# Patient Record
Sex: FEMALE | Race: WHITE | NOT HISPANIC OR LATINO | URBAN - METROPOLITAN AREA
[De-identification: names, ages, dates, MRNs, and addresses within clinical notes are randomized per-mention and may not be internally consistent; named-entity substitution may affect disease eponyms.]

---

## 2017-05-11 ENCOUNTER — INPATIENT (INPATIENT)
Facility: HOSPITAL | Age: 62
LOS: 2 days | Discharge: HOME | DRG: 389 | End: 2017-05-14
Attending: SPECIALIST | Admitting: SPECIALIST
Payer: COMMERCIAL

## 2017-05-11 VITALS
DIASTOLIC BLOOD PRESSURE: 73 MMHG | WEIGHT: 154.98 LBS | RESPIRATION RATE: 20 BRPM | OXYGEN SATURATION: 99 % | TEMPERATURE: 99 F | HEART RATE: 105 BPM | SYSTOLIC BLOOD PRESSURE: 103 MMHG

## 2017-05-11 DIAGNOSIS — K56.69 OTHER INTESTINAL OBSTRUCTION: ICD-10-CM

## 2017-05-11 LAB
ALBUMIN SERPL ELPH-MCNC: 3.6 G/DL — SIGNIFICANT CHANGE UP (ref 3.5–5)
ALP SERPL-CCNC: 60 U/L — SIGNIFICANT CHANGE UP (ref 40–120)
ALT FLD-CCNC: 25 U/L DA — SIGNIFICANT CHANGE UP (ref 10–60)
ANION GAP SERPL CALC-SCNC: 9 MMOL/L — SIGNIFICANT CHANGE UP (ref 5–17)
ANION GAP SERPL CALC-SCNC: 9 MMOL/L — SIGNIFICANT CHANGE UP (ref 5–17)
APPEARANCE UR: CLEAR — SIGNIFICANT CHANGE UP
APPEARANCE UR: CLEAR — SIGNIFICANT CHANGE UP
AST SERPL-CCNC: 51 U/L — HIGH (ref 10–40)
BASOPHILS # BLD AUTO: 0.1 K/UL — SIGNIFICANT CHANGE UP (ref 0–0.2)
BASOPHILS NFR BLD AUTO: 0.4 % — SIGNIFICANT CHANGE UP (ref 0–2)
BILIRUB SERPL-MCNC: 1.2 MG/DL — SIGNIFICANT CHANGE UP (ref 0.2–1.2)
BILIRUB UR-MCNC: NEGATIVE — SIGNIFICANT CHANGE UP
BILIRUB UR-MCNC: NEGATIVE — SIGNIFICANT CHANGE UP
BUN SERPL-MCNC: 20 MG/DL — HIGH (ref 7–18)
BUN SERPL-MCNC: 21 MG/DL — HIGH (ref 7–18)
CALCIUM SERPL-MCNC: 8.4 MG/DL — SIGNIFICANT CHANGE UP (ref 8.4–10.5)
CALCIUM SERPL-MCNC: 9 MG/DL — SIGNIFICANT CHANGE UP (ref 8.4–10.5)
CHLORIDE SERPL-SCNC: 102 MMOL/L — SIGNIFICANT CHANGE UP (ref 96–108)
CHLORIDE SERPL-SCNC: 103 MMOL/L — SIGNIFICANT CHANGE UP (ref 96–108)
CO2 SERPL-SCNC: 23 MMOL/L — SIGNIFICANT CHANGE UP (ref 22–31)
CO2 SERPL-SCNC: 28 MMOL/L — SIGNIFICANT CHANGE UP (ref 22–31)
COLOR SPEC: YELLOW — SIGNIFICANT CHANGE UP
COLOR SPEC: YELLOW — SIGNIFICANT CHANGE UP
CREAT SERPL-MCNC: 1.14 MG/DL — SIGNIFICANT CHANGE UP (ref 0.5–1.3)
CREAT SERPL-MCNC: 1.19 MG/DL — SIGNIFICANT CHANGE UP (ref 0.5–1.3)
DIFF PNL FLD: ABNORMAL
DIFF PNL FLD: ABNORMAL
EOSINOPHIL # BLD AUTO: 0.1 K/UL — SIGNIFICANT CHANGE UP (ref 0–0.5)
EOSINOPHIL NFR BLD AUTO: 0.8 % — SIGNIFICANT CHANGE UP (ref 0–6)
GLUCOSE SERPL-MCNC: 122 MG/DL — HIGH (ref 70–99)
GLUCOSE SERPL-MCNC: 136 MG/DL — HIGH (ref 70–99)
GLUCOSE UR QL: NEGATIVE — SIGNIFICANT CHANGE UP
GLUCOSE UR QL: NEGATIVE — SIGNIFICANT CHANGE UP
HCT VFR BLD CALC: 48.1 % — HIGH (ref 34.5–45)
HGB BLD-MCNC: 16.1 G/DL — HIGH (ref 11.5–15.5)
KETONES UR-MCNC: ABNORMAL
KETONES UR-MCNC: ABNORMAL
LEUKOCYTE ESTERASE UR-ACNC: ABNORMAL
LEUKOCYTE ESTERASE UR-ACNC: ABNORMAL
LIDOCAIN IGE QN: 421 U/L — HIGH (ref 73–393)
LYMPHOCYTES # BLD AUTO: 0.5 K/UL — LOW (ref 1–3.3)
LYMPHOCYTES # BLD AUTO: 3.3 % — LOW (ref 13–44)
MCHC RBC-ENTMCNC: 29.2 PG — SIGNIFICANT CHANGE UP (ref 27–34)
MCHC RBC-ENTMCNC: 33.4 GM/DL — SIGNIFICANT CHANGE UP (ref 32–36)
MCV RBC AUTO: 87.4 FL — SIGNIFICANT CHANGE UP (ref 80–100)
MONOCYTES # BLD AUTO: 0.9 K/UL — SIGNIFICANT CHANGE UP (ref 0–0.9)
MONOCYTES NFR BLD AUTO: 6.1 % — SIGNIFICANT CHANGE UP (ref 2–14)
NEUTROPHILS # BLD AUTO: 12.9 K/UL — HIGH (ref 1.8–7.4)
NEUTROPHILS NFR BLD AUTO: 89.3 % — HIGH (ref 43–77)
NITRITE UR-MCNC: NEGATIVE — SIGNIFICANT CHANGE UP
NITRITE UR-MCNC: NEGATIVE — SIGNIFICANT CHANGE UP
PH UR: 5 — SIGNIFICANT CHANGE UP (ref 5–8)
PH UR: 5 — SIGNIFICANT CHANGE UP (ref 5–8)
PLATELET # BLD AUTO: 188 K/UL — SIGNIFICANT CHANGE UP (ref 150–400)
POTASSIUM SERPL-MCNC: 4.4 MMOL/L — SIGNIFICANT CHANGE UP (ref 3.5–5.3)
POTASSIUM SERPL-MCNC: 6 MMOL/L — HIGH (ref 3.5–5.3)
POTASSIUM SERPL-SCNC: 4.4 MMOL/L — SIGNIFICANT CHANGE UP (ref 3.5–5.3)
POTASSIUM SERPL-SCNC: 6 MMOL/L — HIGH (ref 3.5–5.3)
PROT SERPL-MCNC: 8.5 G/DL — HIGH (ref 6–8.3)
PROT UR-MCNC: 15
PROT UR-MCNC: NEGATIVE — SIGNIFICANT CHANGE UP
RBC # BLD: 5.5 M/UL — HIGH (ref 3.8–5.2)
RBC # FLD: 11.9 % — SIGNIFICANT CHANGE UP (ref 10.3–14.5)
SODIUM SERPL-SCNC: 135 MMOL/L — SIGNIFICANT CHANGE UP (ref 135–145)
SODIUM SERPL-SCNC: 139 MMOL/L — SIGNIFICANT CHANGE UP (ref 135–145)
SP GR SPEC: 1.02 — SIGNIFICANT CHANGE UP (ref 1.01–1.02)
SP GR SPEC: 1.02 — SIGNIFICANT CHANGE UP (ref 1.01–1.02)
UROBILINOGEN FLD QL: NEGATIVE — SIGNIFICANT CHANGE UP
UROBILINOGEN FLD QL: NEGATIVE — SIGNIFICANT CHANGE UP
WBC # BLD: 14.4 K/UL — HIGH (ref 3.8–10.5)
WBC # FLD AUTO: 14.4 K/UL — HIGH (ref 3.8–10.5)

## 2017-05-11 PROCEDURE — 71010: CPT | Mod: 26

## 2017-05-11 PROCEDURE — 99285 EMERGENCY DEPT VISIT HI MDM: CPT

## 2017-05-11 PROCEDURE — 74177 CT ABD & PELVIS W/CONTRAST: CPT | Mod: 26

## 2017-05-11 RX ORDER — SODIUM CHLORIDE 9 MG/ML
3 INJECTION INTRAMUSCULAR; INTRAVENOUS; SUBCUTANEOUS ONCE
Qty: 0 | Refills: 0 | Status: COMPLETED | OUTPATIENT
Start: 2017-05-11 | End: 2017-05-11

## 2017-05-11 RX ORDER — METOPROLOL TARTRATE 50 MG
2.5 TABLET ORAL EVERY 6 HOURS
Qty: 0 | Refills: 0 | Status: DISCONTINUED | OUTPATIENT
Start: 2017-05-11 | End: 2017-05-13

## 2017-05-11 RX ORDER — ONDANSETRON 8 MG/1
4 TABLET, FILM COATED ORAL EVERY 6 HOURS
Qty: 0 | Refills: 0 | Status: DISCONTINUED | OUTPATIENT
Start: 2017-05-11 | End: 2017-05-14

## 2017-05-11 RX ORDER — FAMOTIDINE 10 MG/ML
20 INJECTION INTRAVENOUS ONCE
Qty: 0 | Refills: 0 | Status: COMPLETED | OUTPATIENT
Start: 2017-05-11 | End: 2017-05-11

## 2017-05-11 RX ORDER — MORPHINE SULFATE 50 MG/1
4 CAPSULE, EXTENDED RELEASE ORAL ONCE
Qty: 0 | Refills: 0 | Status: DISCONTINUED | OUTPATIENT
Start: 2017-05-11 | End: 2017-05-11

## 2017-05-11 RX ORDER — PANTOPRAZOLE SODIUM 20 MG/1
40 TABLET, DELAYED RELEASE ORAL DAILY
Qty: 0 | Refills: 0 | Status: DISCONTINUED | OUTPATIENT
Start: 2017-05-11 | End: 2017-05-14

## 2017-05-11 RX ORDER — SODIUM CHLORIDE 9 MG/ML
1000 INJECTION INTRAMUSCULAR; INTRAVENOUS; SUBCUTANEOUS ONCE
Qty: 0 | Refills: 0 | Status: COMPLETED | OUTPATIENT
Start: 2017-05-11 | End: 2017-05-11

## 2017-05-11 RX ORDER — LIDOCAINE 4 G/100G
20 CREAM TOPICAL ONCE
Qty: 0 | Refills: 0 | Status: COMPLETED | OUTPATIENT
Start: 2017-05-11 | End: 2017-05-11

## 2017-05-11 RX ORDER — HEPARIN SODIUM 5000 [USP'U]/ML
5000 INJECTION INTRAVENOUS; SUBCUTANEOUS EVERY 8 HOURS
Qty: 0 | Refills: 0 | Status: DISCONTINUED | OUTPATIENT
Start: 2017-05-11 | End: 2017-05-14

## 2017-05-11 RX ORDER — SODIUM CHLORIDE 9 MG/ML
1000 INJECTION, SOLUTION INTRAVENOUS
Qty: 0 | Refills: 0 | Status: DISCONTINUED | OUTPATIENT
Start: 2017-05-11 | End: 2017-05-14

## 2017-05-11 RX ADMIN — LIDOCAINE 20 MILLILITER(S): 4 CREAM TOPICAL at 18:38

## 2017-05-11 RX ADMIN — SODIUM CHLORIDE 1000 MILLILITER(S): 9 INJECTION INTRAMUSCULAR; INTRAVENOUS; SUBCUTANEOUS at 16:15

## 2017-05-11 RX ADMIN — SODIUM CHLORIDE 3 MILLILITER(S): 9 INJECTION INTRAMUSCULAR; INTRAVENOUS; SUBCUTANEOUS at 16:15

## 2017-05-11 RX ADMIN — MORPHINE SULFATE 4 MILLIGRAM(S): 50 CAPSULE, EXTENDED RELEASE ORAL at 23:25

## 2017-05-11 RX ADMIN — SODIUM CHLORIDE 125 MILLILITER(S): 9 INJECTION, SOLUTION INTRAVENOUS at 22:42

## 2017-05-11 RX ADMIN — MORPHINE SULFATE 4 MILLIGRAM(S): 50 CAPSULE, EXTENDED RELEASE ORAL at 22:53

## 2017-05-11 RX ADMIN — PANTOPRAZOLE SODIUM 40 MILLIGRAM(S): 20 TABLET, DELAYED RELEASE ORAL at 22:42

## 2017-05-11 RX ADMIN — HEPARIN SODIUM 5000 UNIT(S): 5000 INJECTION INTRAVENOUS; SUBCUTANEOUS at 22:42

## 2017-05-11 RX ADMIN — Medication 30 MILLILITER(S): at 16:15

## 2017-05-11 RX ADMIN — FAMOTIDINE 20 MILLIGRAM(S): 10 INJECTION INTRAVENOUS at 16:15

## 2017-05-11 NOTE — H&P ADULT - HISTORY OF PRESENT ILLNESS
63 y/o female with h/o anxiety, HTN, HCL, heart murmur, gastritis/ulcer dx, UTI, SBO  c/o lower abd crampy pain, loss of appetite since yesterday; no f/c/n/v; +bm yesterday no flatus  denies PSH

## 2017-05-11 NOTE — ED PROVIDER NOTE - OBJECTIVE STATEMENT
63 y/o F pt with PMHx of Anxiety (on Zoloft and Sertraline 25mg), Heart Murmur, Herpes Simplex, HTN (controlled; on Metoprolol 25mg and Lisinopril 12.5mg), SBO, UTI, Gastritis, and Hypercholesterolemia (on Rosuvastatin 25mg) presents to ED c/o lower abd pain (cramping pain), general weakness, belching, loss of appetite, and soft stools (yellow color) since yesterday (19:00pm). Pt also states chills in ED, as well as a HA earlier today that was resolved with Tylenol. Pt reports that her presenting sx's do not feel similar to the sx's associated with her previous SBO. Pt denies fever, diarrhea, dysuria, burning with urination, urinary frequency, hematuria, or any other complaints. Pt also denies Hx of smoking, EtOH abuse/use, drug use/abuse, or Hx of similar sx's/episodes. Pt reports taking Imodium and Gas-X with no relief of sx's. NKDA.

## 2017-05-11 NOTE — ED PROVIDER NOTE - PROGRESS NOTE DETAILS
Sita: signout from Dr Winters to followup CT scan. CT reveals SBO. NGT placed, admit to surgery. morphine and zofran for comfort

## 2017-05-11 NOTE — H&P ADULT - NSHPLABSRESULTS_GEN_ALL_CORE
CT- Possible small   gallstones in the gallbladder neck. No evidence for thickened gallbladder   wall or pericholecystic fluid. In the pancreatic body, there is a 1.4 cm   cystic lesion. Multiple small nonspecific hypodense   lesions in the spleen, too small to characterize. Stable punctate   calcifications in the left adrenal. The right kidney and right adrenal   appear unremarkable. Tiny hypodense lesions in the left kidney, too small   to characterize.    Evaluation of the bowel structures is limited by lack of oral contrast.   The appendix appears normal. Small bowel dilatation is again noted with   an apparent transition point at the distal ileum in the right lower   quadrant, compatible with recurrence of small bowel   obstruction. Small ascites in the anterior abdomen and mesentery for   which associated bowel ischemia cannot be excluded. No gross evidence for   thickened bowel wall. No evidence for free air or enlarged lymph node.

## 2017-05-11 NOTE — ED PROVIDER NOTE - NS ED MD SCRIBE ATTENDING SCRIBE SECTIONS
HISTORY OF PRESENT ILLNESS/VITAL SIGNS( Pullset)/PHYSICAL EXAM/PAST MEDICAL/SURGICAL/SOCIAL HISTORY/HIV/REVIEW OF SYSTEMS/RESULTS/DISPOSITION

## 2017-05-11 NOTE — H&P ADULT - NSHPPHYSICALEXAM_GEN_ALL_CORE
NAD  NGT placed by ED  abd softly distended, mild generalized tenderness at this time, no rebound or guarding, no peritoneal signs

## 2017-05-11 NOTE — ED PROVIDER NOTE - PMH
Anxiety    Gastritis    Heart murmur    Herpes simplex    HTN (hypertension)    Hypercholesteremia    SBO (small bowel obstruction)    UTI (urinary tract infection)

## 2017-05-11 NOTE — ED PROVIDER NOTE - MEDICAL DECISION MAKING DETAILS
61 y/o F pt presents with general weakness, lower abd pain, belching, loss of appetite, and soft stools since last night. Plan for blood work, IVFs, labs, give medication for gastritis, CT Abd/Pel, and reassess.

## 2017-05-11 NOTE — ED ADULT NURSE NOTE - OBJECTIVE STATEMENT
Presented to ED complaining of "abdominal cramps since last night at 7pm." AA&Ox3. Denies nausea, vomiting, diarrhea and fever.

## 2017-05-12 DIAGNOSIS — K56.69 OTHER INTESTINAL OBSTRUCTION: ICD-10-CM

## 2017-05-12 LAB
ANION GAP SERPL CALC-SCNC: 8 MMOL/L — SIGNIFICANT CHANGE UP (ref 5–17)
BUN SERPL-MCNC: 20 MG/DL — HIGH (ref 7–18)
CALCIUM SERPL-MCNC: 8.2 MG/DL — LOW (ref 8.4–10.5)
CHLORIDE SERPL-SCNC: 108 MMOL/L — SIGNIFICANT CHANGE UP (ref 96–108)
CO2 SERPL-SCNC: 29 MMOL/L — SIGNIFICANT CHANGE UP (ref 22–31)
CREAT SERPL-MCNC: 1.18 MG/DL — SIGNIFICANT CHANGE UP (ref 0.5–1.3)
GLUCOSE SERPL-MCNC: 142 MG/DL — HIGH (ref 70–99)
HCT VFR BLD CALC: 38.2 % — SIGNIFICANT CHANGE UP (ref 34.5–45)
HGB BLD-MCNC: 12.9 G/DL — SIGNIFICANT CHANGE UP (ref 11.5–15.5)
MCHC RBC-ENTMCNC: 29.4 PG — SIGNIFICANT CHANGE UP (ref 27–34)
MCHC RBC-ENTMCNC: 33.7 GM/DL — SIGNIFICANT CHANGE UP (ref 32–36)
MCV RBC AUTO: 87.2 FL — SIGNIFICANT CHANGE UP (ref 80–100)
PLATELET # BLD AUTO: 180 K/UL — SIGNIFICANT CHANGE UP (ref 150–400)
POTASSIUM SERPL-MCNC: 4.1 MMOL/L — SIGNIFICANT CHANGE UP (ref 3.5–5.3)
POTASSIUM SERPL-SCNC: 4.1 MMOL/L — SIGNIFICANT CHANGE UP (ref 3.5–5.3)
RBC # BLD: 4.38 M/UL — SIGNIFICANT CHANGE UP (ref 3.8–5.2)
RBC # FLD: 12 % — SIGNIFICANT CHANGE UP (ref 10.3–14.5)
SODIUM SERPL-SCNC: 145 MMOL/L — SIGNIFICANT CHANGE UP (ref 135–145)
WBC # BLD: 3 K/UL — LOW (ref 3.8–10.5)
WBC # FLD AUTO: 3 K/UL — LOW (ref 3.8–10.5)

## 2017-05-12 PROCEDURE — 74250 X-RAY XM SM INT 1CNTRST STD: CPT | Mod: 26

## 2017-05-12 RX ORDER — CEFTRIAXONE 500 MG/1
1 INJECTION, POWDER, FOR SOLUTION INTRAMUSCULAR; INTRAVENOUS EVERY 24 HOURS
Qty: 0 | Refills: 0 | Status: DISCONTINUED | OUTPATIENT
Start: 2017-05-13 | End: 2017-05-14

## 2017-05-12 RX ORDER — CEFTRIAXONE 500 MG/1
1 INJECTION, POWDER, FOR SOLUTION INTRAMUSCULAR; INTRAVENOUS ONCE
Qty: 0 | Refills: 0 | Status: COMPLETED | OUTPATIENT
Start: 2017-05-12 | End: 2017-05-12

## 2017-05-12 RX ORDER — KETOTIFEN FUMARATE 0.34 MG/ML
1 SOLUTION OPHTHALMIC
Qty: 0 | Refills: 0 | Status: DISCONTINUED | OUTPATIENT
Start: 2017-05-12 | End: 2017-05-12

## 2017-05-12 RX ORDER — CEFTRIAXONE 500 MG/1
INJECTION, POWDER, FOR SOLUTION INTRAMUSCULAR; INTRAVENOUS
Qty: 0 | Refills: 0 | Status: DISCONTINUED | OUTPATIENT
Start: 2017-05-12 | End: 2017-05-14

## 2017-05-12 RX ADMIN — SODIUM CHLORIDE 125 MILLILITER(S): 9 INJECTION, SOLUTION INTRAVENOUS at 21:37

## 2017-05-12 RX ADMIN — HEPARIN SODIUM 5000 UNIT(S): 5000 INJECTION INTRAVENOUS; SUBCUTANEOUS at 12:08

## 2017-05-12 RX ADMIN — HEPARIN SODIUM 5000 UNIT(S): 5000 INJECTION INTRAVENOUS; SUBCUTANEOUS at 05:56

## 2017-05-12 RX ADMIN — PANTOPRAZOLE SODIUM 40 MILLIGRAM(S): 20 TABLET, DELAYED RELEASE ORAL at 12:08

## 2017-05-12 RX ADMIN — Medication 1.25 MILLIGRAM(S): at 05:57

## 2017-05-12 RX ADMIN — CEFTRIAXONE 100 GRAM(S): 500 INJECTION, POWDER, FOR SOLUTION INTRAMUSCULAR; INTRAVENOUS at 12:08

## 2017-05-12 RX ADMIN — HEPARIN SODIUM 5000 UNIT(S): 5000 INJECTION INTRAVENOUS; SUBCUTANEOUS at 21:37

## 2017-05-12 RX ADMIN — Medication 2.5 MILLIGRAM(S): at 05:56

## 2017-05-12 NOTE — PROGRESS NOTE ADULT - ASSESSMENT
Pt with SBO without prior surgery.  UGI showed slow transit time but progression into colon without evidence of tumor or other abnormality

## 2017-05-12 NOTE — CONSULT NOTE ADULT - ASSESSMENT
Pt with as above with SBO and abnormal EKG.  1. NPO  2.IVF  3. Echocardiogram.  4. Continue BP medication IV for now.  5. GI and DVT prophylaxis.

## 2017-05-12 NOTE — PROGRESS NOTE ADULT - SUBJECTIVE AND OBJECTIVE BOX
62y Female without complaints. no nausea/vomitting. no flatus/bm yet    NGT-lws, output 100cc    T(C): 37.1, Max: 38.1 (05-11 @ 23:27)  HR: 88 (87 - 105)  BP: 90/44 (90/44 - 112/59)  RR: 17 (16 - 20)  SpO2: 98% (98% - 100%)        PE:  Gen: comfortable, NAD  Abd: Soft, NT/ND

## 2017-05-12 NOTE — CONSULT NOTE ADULT - SUBJECTIVE AND OBJECTIVE BOX
CHIEF COMPLAINT:Patient is a 62y old  Female who presents with a chief complaint of abd pain.      HPI:  61 y/o female with h/o anxiety, HTN, Lipid D/O,gastritis/ulcer , UTI, SBO who presents to ER with  c/o lower abd crampy pain, loss of appetite since yesterday. Pt has been found to have SBO and abnormal EKG on admission.      PAST MEDICAL & SURGICAL HISTORY:  Gastritis  SBO (small bowel obstruction)  UTI (urinary tract infection)  Hypercholesteremia  Herpes simplex  HTN (hypertension)  Anxiety      MEDICATIONS  (STANDING):  dextrose 5% + sodium chloride 0.9%. 1000milliLiter(s) IV Continuous <Continuous>  heparin  Injectable 5000Unit(s) SubCutaneous every 8 hours  pantoprazole  Injectable 40milliGRAM(s) IV Push daily  metoprolol Injectable 2.5milliGRAM(s) IV Push every 6 hours  enalaprilat Injectable 1.25milliGRAM(s) IV Push every 6 hours  cefTRIAXone   IVPB  IV Intermittent   ketotifen 0.025% Ophthalmic Solution 1Drop(s) Both EYES two times a day    MEDICATIONS  (PRN):  ondansetron Injectable 4milliGRAM(s) IV Push every 6 hours PRN Nausea and/or Vomiting      FAMILY HISTORY:  No pertinent family history in first degree relatives      SOCIAL HISTORY:    [ X] Non-smoker    [X ] Denies Alcohol    Allergies    No Known Allergies        REVIEW OF SYSTEMS:  CONSTITUTIONAL: No fever, weight loss, or fatigue  EYES: No eye pain, visual disturbances, or discharge  ENT:  No difficulty hearing, tinnitus, vertigo; No sinus or throat pain  NECK: No pain or stiffness  RESPIRATORY: No cough, wheezing, chills or hemoptysis; No Shortness of Breath  CARDIOVASCULAR: No chest pain, palpitations, passing out, dizziness, or leg swelling  GASTROINTESTINAL: No abdominal or epigastric pain. No nausea, vomiting, or hematemesis; No diarrhea or constipation. No melena or hematochezia.  GENITOURINARY: No dysuria, frequency, hematuria, or incontinence  NEUROLOGICAL: No headaches, memory loss, loss of strength, numbness, or tremors  SKIN: No itching, burning, rashes, or lesions   LYMPH Nodes: No enlarged glands  ENDOCRINE: No heat or cold intolerance; No hair loss  MUSCULOSKELETAL: No joint pain or swelling; No muscle, back, or extremity pain  PSYCHIATRIC: No depression, anxiety, mood swings, or difficulty sleeping  HEME/LYMPH: No easy bruising, or bleeding gums  ALLERGY AND IMMUNOLOGIC: No hives or eczema	      PHYSICAL EXAM:  T(C): 36.3, Max: 38.1 (05-11 @ 23:27)  HR: 86 (86 - 105)  BP: 103/56 (90/44 - 112/59)  RR: 15 (15 - 20)  SpO2: 100% (98% - 100%)    I&O's Summary    I & Os for current day (as of 12 May 2017 12:20)  =============================================  IN: 900 ml / OUT: 100 ml / NET: 800 ml      Appearance: Normal	  HEENT:   Normal oral mucosa, PERRL, EOMI	  Lymphatic: No lymphadenopathy  Cardiovascular: Normal S1 S2, No JVD, No murmurs, No edema  Respiratory: Lungs clear to auscultation	  Psychiatry: A & O x 3, Mood & affect appropriate  Gastrointestinal:  Soft, Non-tender, + BS	  Skin: No rashes, No ecchymoses, No cyanosis	  Neurologic: Non-focal  Extremities: Normal range of motion, No clubbing, cyanosis or edema  Vascular: Peripheral pulses palpable 2+ bilaterally    TELEMETRY:NSR 	    ECG: NSR with ST-T depression inferior and anterolateral leads 	  RADIOLOGY:  OTHER: 	  	  LABS:	 	                         12.9   3.0   )-----------( 180      ( 12 May 2017 06:32 )             38.2     05-12    145  |  108  |  20<H>  ----------------------------<  142<H>  4.1   |  29  |  1.18    Ca    8.2<L>      12 May 2017 06:32    TPro  8.5<H>  /  Alb  3.6  /  TBili  1.2  /  DBili  x   /  AST  51<H>  /  ALT  25  /  AlkPhos  60  05-11        PREVIOUS DIAGNOSTIC TESTING:    [ ] Echocardiogram:CHIEF COMPLAINT:Patient is a 62y old  Female who presents with a chief complaint of abd pain/ sbo (11 May 2017 23:49)      HPI:  61 y/o female with h/o anxiety, HTN, HCL, heart murmur, gastritis/ulcer dx, UTI, SBO  c/o lower abd crampy pain, loss of appetite since yesterday; no f/c/n/v; +bm yesterday no flatus  denies PSH (11 May 2017 23:49)      PAST MEDICAL & SURGICAL HISTORY:  Gastritis  SBO (small bowel obstruction)  UTI (urinary tract infection)  Heart murmur  Hypercholesteremia  Herpes simplex  HTN (hypertension)  Anxiety  No significant past surgical history      MEDICATIONS  (STANDING):  dextrose 5% + sodium chloride 0.9%. 1000milliLiter(s) IV Continuous <Continuous>  heparin  Injectable 5000Unit(s) SubCutaneous every 8 hours  pantoprazole  Injectable 40milliGRAM(s) IV Push daily  metoprolol Injectable 2.5milliGRAM(s) IV Push every 6 hours  enalaprilat Injectable 1.25milliGRAM(s) IV Push every 6 hours  cefTRIAXone   IVPB  IV Intermittent   ketotifen 0.025% Ophthalmic Solution 1Drop(s) Both EYES two times a day    MEDICATIONS  (PRN):  ondansetron Injectable 4milliGRAM(s) IV Push every 6 hours PRN Nausea and/or Vomiting      FAMILY HISTORY:  No pertinent family history in first degree relatives      SOCIAL HISTORY:    [ ] Non-smoker  [ ] Smoker  [ ] Alcohol    Allergies    No Known Allergies    Intolerances    	    REVIEW OF SYSTEMS:  CONSTITUTIONAL: No fever, weight loss, or fatigue  EYES: No eye pain, visual disturbances, or discharge  ENT:  No difficulty hearing, tinnitus, vertigo; No sinus or throat pain  NECK: No pain or stiffness  RESPIRATORY: No cough, wheezing, chills or hemoptysis; No Shortness of Breath  CARDIOVASCULAR: No chest pain, palpitations, passing out, dizziness, or leg swelling  GASTROINTESTINAL: No abdominal or epigastric pain. No nausea, vomiting, or hematemesis; No diarrhea or constipation. No melena or hematochezia.  GENITOURINARY: No dysuria, frequency, hematuria, or incontinence  NEUROLOGICAL: No headaches, memory loss, loss of strength, numbness, or tremors  SKIN: No itching, burning, rashes, or lesions   LYMPH Nodes: No enlarged glands  ENDOCRINE: No heat or cold intolerance; No hair loss  MUSCULOSKELETAL: No joint pain or swelling; No muscle, back, or extremity pain  PSYCHIATRIC: No depression, anxiety, mood swings, or difficulty sleeping  HEME/LYMPH: No easy bruising, or bleeding gums  ALLERGY AND IMMUNOLOGIC: No hives or eczema	    [ ] All others negative	  [ ] Unable to obtain    PHYSICAL EXAM:  T(C): 36.3, Max: 38.1 (05-11 @ 23:27)  HR: 86 (86 - 105)  BP: 103/56 (90/44 - 112/59)  RR: 15 (15 - 20)  SpO2: 100% (98% - 100%)  Wt(kg): --  I&O's Summary    I & Os for current day (as of 12 May 2017 12:20)  =============================================  IN: 900 ml / OUT: 100 ml / NET: 800 ml      Appearance: Normal	  HEENT:   Normal oral mucosa, PERRL, EOMI	  Lymphatic: No lymphadenopathy  Cardiovascular: Normal S1 S2, No JVD, No murmurs, No edema  Respiratory: Lungs clear to auscultation	  Psychiatry: A & O x 3, Mood & affect appropriate  Gastrointestinal:  Soft, Non-tender, + BS	  Skin: No rashes, No ecchymoses, No cyanosis	  Neurologic: Non-focal  Extremities: Normal range of motion, No clubbing, cyanosis or edema  Vascular: Peripheral pulses palpable 2+ bilaterally    TELEMETRY: 	    ECG:  	  RADIOLOGY:  OTHER: 	  	  LABS:	 	    CARDIAC MARKERS:                              12.9   3.0   )-----------( 180      ( 12 May 2017 06:32 )             38.2     05-12    145  |  108  |  20<H>  ----------------------------<  142<H>  4.1   |  29  |  1.18    Ca    8.2<L>      12 May 2017 06:32    TPro  8.5<H>  /  Alb  3.6  /  TBili  1.2  /  DBili  x   /  AST  51<H>  /  ALT  25  /  AlkPhos  60  05-11    proBNP:   Lipid Profile:   HgA1c:   TSH:     PREVIOUS DIAGNOSTIC TESTING:    [ ] Echocardiogram:CHIEF COMPLAINT:Patient is a 62y old  Female who presents with a chief complaint of abd pain/ sbo (11 May 2017 23:49)      HPI:  61 y/o female with h/o anxiety, HTN, HCL, heart murmur, gastritis/ulcer dx, UTI, SBO  c/o lower abd crampy pain, loss of appetite since yesterday; no f/c/n/v; +bm yesterday no flatus  denies PSH (11 May 2017 23:49)      PAST MEDICAL & SURGICAL HISTORY:  Gastritis  SBO (small bowel obstruction)  UTI (urinary tract infection)  Heart murmur  Hypercholesteremia  Herpes simplex  HTN (hypertension)  Anxiety  No significant past surgical history      MEDICATIONS  (STANDING):  dextrose 5% + sodium chloride 0.9%. 1000milliLiter(s) IV Continuous <Continuous>  heparin  Injectable 5000Unit(s) SubCutaneous every 8 hours  pantoprazole  Injectable 40milliGRAM(s) IV Push daily  metoprolol Injectable 2.5milliGRAM(s) IV Push every 6 hours  enalaprilat Injectable 1.25milliGRAM(s) IV Push every 6 hours  cefTRIAXone   IVPB  IV Intermittent   ketotifen 0.025% Ophthalmic Solution 1Drop(s) Both EYES two times a day    MEDICATIONS  (PRN):  ondansetron Injectable 4milliGRAM(s) IV Push every 6 hours PRN Nausea and/or Vomiting      FAMILY HISTORY:  No pertinent family history in first degree relatives      SOCIAL HISTORY:    [ ] Non-smoker  [ ] Smoker  [ ] Alcohol    Allergies    No Known Allergies    Intolerances    	    REVIEW OF SYSTEMS:  CONSTITUTIONAL: No fever, weight loss, or fatigue  EYES: No eye pain, visual disturbances, or discharge  ENT:  No difficulty hearing, tinnitus, vertigo; No sinus or throat pain  NECK: No pain or stiffness  RESPIRATORY: No cough, wheezing, chills or hemoptysis; No Shortness of Breath  CARDIOVASCULAR: No chest pain, palpitations, passing out, dizziness, or leg swelling  GASTROINTESTINAL: No abdominal or epigastric pain. No nausea, vomiting, or hematemesis; No diarrhea or constipation. No melena or hematochezia.  GENITOURINARY: No dysuria, frequency, hematuria, or incontinence  NEUROLOGICAL: No headaches, memory loss, loss of strength, numbness, or tremors  SKIN: No itching, burning, rashes, or lesions   LYMPH Nodes: No enlarged glands  ENDOCRINE: No heat or cold intolerance; No hair loss  MUSCULOSKELETAL: No joint pain or swelling; No muscle, back, or extremity pain  PSYCHIATRIC: No depression, anxiety, mood swings, or difficulty sleeping  HEME/LYMPH: No easy bruising, or bleeding gums  ALLERGY AND IMMUNOLOGIC: No hives or eczema	    [ ] All others negative	  [ ] Unable to obtain    PHYSICAL EXAM:  T(C): 36.3, Max: 38.1 (05-11 @ 23:27)  HR: 86 (86 - 105)  BP: 103/56 (90/44 - 112/59)  RR: 15 (15 - 20)  SpO2: 100% (98% - 100%)  Wt(kg): --  I&O's Summary    I & Os for current day (as of 12 May 2017 12:20)  =============================================  IN: 900 ml / OUT: 100 ml / NET: 800 ml      Appearance: Normal	  HEENT:   Normal oral mucosa, PERRL, EOMI	  Lymphatic: No lymphadenopathy  Cardiovascular: Normal S1 S2, No JVD, No murmurs, No edema  Respiratory: Lungs clear to auscultation	  Psychiatry: A & O x 3, Mood & affect appropriate  Gastrointestinal:  Soft, Non-tender, + BS	  Skin: No rashes, No ecchymoses, No cyanosis	  Neurologic: Non-focal  Extremities: Normal range of motion, No clubbing, cyanosis or edema  Vascular: Peripheral pulses palpable 2+ bilaterally    TELEMETRY: 	    ECG:  	  RADIOLOGY:  OTHER: 	  	  LABS:	 	    CARDIAC MARKERS:                              12.9   3.0   )-----------( 180      ( 12 May 2017 06:32 )             38.2     05-12    145  |  108  |  20<H>  ----------------------------<  142<H>  4.1   |  29  |  1.18    Ca    8.2<L>      12 May 2017 06:32    TPro  8.5<H>  /  Alb  3.6  /  TBili  1.2  /  DBili  x   /  AST  51<H>  /  ALT  25  /  AlkPhos  60  05-11    SB series: Partial small bowel obstruction.    CT abd and pelvis: Findings are compatible with small bowel obstruction with a   transition point at the distal ilium in the right lower quadrant. Small   ascites for which associated bowel ischemia cannot be excluded. No gross   bowel wall thickening. The appendix appears normal.    Fatty liver.     Possible gallstones in the gallbladder neck. If clinically indicated,   gallbladder ultrasound may be pursued for further evaluation.    1.4 cm nonspecific cystic lesion in the pancreatic body. If clinically   indicated, abdominal MR without andwith IV contrast may pursued for   further evaluation on a nonemergent outpatient basis.

## 2017-05-12 NOTE — PROGRESS NOTE ADULT - SUBJECTIVE AND OBJECTIVE BOX
Patient is a 62y old  Female who presents with a chief complaint of abd pain/ sbo (11 May 2017 23:49)      HPI:  63 y/o female with h/o anxiety, HTN, HCL, heart murmur, gastritis/ulcer dx, UTI, SBO  c/o lower abd crampy pain, loss of appetite since yesterday; no f/c/n/v; +bm yesterday no flatus  denies PSH (11 May 2017 23:49) Has had no nausea or vomiting today  + diarrhea  following UGI series.  NGT drained 100ml overnight.  No c/o pain.  Hungry!        Vital Signs Last 24 Hrs  T(C): 36.3, Max: 38.1 ( @ 23:27)  T(F): 97.4, Max: 100.6 ( @ 23:27)  HR: 86 (86 - 105)  BP: 103/56 (90/44 - 112/59)  BP(mean): --  RR: 15 (15 - 20)  SpO2: 100% (98% - 100%)      SUBJECTIVE: Pt seen    Vomiting: [ ] YES [x ] NO  Flatus: [x ] YES [ ] NO             Bowel Movement: [x ] YES [ ] NO  Diarrhea: [ x] YES [ ] NO         Void: [x ]YES [ ]No  Constipation: [ ] YES [x ] NO     Pain (0-10):0              Pain Control Adequate: [ ] YES [ ] NO  NGT: 100 ml  PHYSICAL EXAM:  Constitutional: Patient well nourish. well developed mildly obese lying comfortably in bed  .      Respiratory:  Lungs CTA, B/L,   Cardiovascular:  , RRR  Gastrointestinal: Abdomen soft, non distended, + BS, no tenderness or guarding, no incisions no hernias  Genitourinary:  Normal.    Extremities:  No edema, no calf tenderness,                        I&O's Summary    I & Os for current day (as of 12 May 2017 14:14)  =============================================  IN: 900 ml / OUT: 100 ml / NET: 800 ml    I&O's Detail    I & Os for current day (as of 12 May 2017 14:14)  =============================================  IN:    dextrose 5% + sodium chloride 0.9%.: 900 ml    Total IN: 900 ml  ---------------------------------------------  OUT:    Nasoenteral Tube: 100 ml    Total OUT: 100 ml  ---------------------------------------------  Total NET: 800 ml      MEDICATIONS  (STANDING):  dextrose 5% + sodium chloride 0.9%. 1000milliLiter(s) IV Continuous <Continuous>  heparin  Injectable 5000Unit(s) SubCutaneous every 8 hours  pantoprazole  Injectable 40milliGRAM(s) IV Push daily  metoprolol Injectable 2.5milliGRAM(s) IV Push every 6 hours  enalaprilat Injectable 1.25milliGRAM(s) IV Push every 6 hours  cefTRIAXone   IVPB  IV Intermittent     MEDICATIONS  (PRN):  ondansetron Injectable 4milliGRAM(s) IV Push every 6 hours PRN Nausea and/or Vomiting      LABS:                        12.9   3.0   )-----------( 180      ( 12 May 2017 06:32 )             38.2     05-12    145  |  108  |  20<H>  ----------------------------<  142<H>  4.1   |  29  |  1.18    Ca    8.2<L>      12 May 2017 06:32    TPro  8.5<H>  /  Alb  3.6  /  TBili  1.2  /  DBili  x   /  AST  51<H>  /  ALT  25  /  AlkPhos  60  05-11      Urinalysis Basic - ( 11 May 2017 20:46 )    Color: Yellow / Appearance: Clear / S.020 / pH: x  Gluc: x / Ketone: Large  / Bili: Negative / Urobili: Negative   Blood: x / Protein: Negative / Nitrite: Negative   Leuk Esterase: Moderate / RBC: 5-10 /HPF / WBC 11-25 /HPF   Sq Epi: x / Non Sq Epi: Moderate / Bacteria: Few /HPF        RADIOLOGY & ADDITIONAL STUDIES:

## 2017-05-12 NOTE — ED ADULT NURSE REASSESSMENT NOTE - NS ED NURSE REASSESS COMMENT FT1
Pt was endorsed by KENNETH glover. Pt is alert and oriented x 3 upon encounter.
550ml output from NGT

## 2017-05-12 NOTE — PROGRESS NOTE ADULT - SUBJECTIVE AND OBJECTIVE BOX
Internal Medicine    Chief complaint  Abdominal pain  HPI    61 y/o F pt with PMHx of Anxiety (on Zoloft and Sertraline 25mg), Heart Murmur, Herpes Simplex, HTN (controlled; on Metoprolol 25mg and Lisinopril 12.5mg), SBO, UTI, Gastritis, and Hypercholesterolemia (on Rosuvastatin 25mg) presents to ED c/o lower abd pain (cramping pain), general weakness, belching, loss of appetite, and soft stools (yellow color) since yesterday (19:00pm). Pt also states chills in ED, as well as a HA earlier today that was resolved with Tylenol. Pt reports that her presenting sx's do not feel similar to the sx's associated with her previous SBO. Pt denies fever, diarrhea, dysuria, burning with urination, urinary frequency, hematuria, or any other complaints. Pt also denies Hx of smoking, EtOH abuse/use, drug use/abuse, or Hx of similar sx's/episodes. Pt reports taking Imodium and Gas-X with no relief of sx's. NKDA.    In ER seen by surgical consult and admitted for r/o SBO under surgical service      PMH    Knee pain (719.46 | M25.569)    Hyperlipemia (272.4 | E78.5)    Benign essential hypertension (401.1 | I10)    Anxiety and depression (300.4 | F41.8)    GERD (gastroesophageal reflux disease) (530.81 | K21.9)    Hypertension (401.9 | I10)    PUD (peptic ulcer disease) (533.90 | K27.9)    Constipation    adverse reaction to cipro - diarrhea    Hypertension    Abnormal EKG (794.31 | R94.31)    Anxiety Disorder    Elevated serum creatinine (790.99 | R74.8)    h/o Herpes Zoster      Surgical history      Medx  Crestor  (5MG Tablet, 1 Tablet Oral daily, Taken starting 01/28/2017) Active.  Sertraline HCl  (25MG Tablet, 1 Tablet Oral daily, Taken starting 01/08/2017) Active.  Metoprolol Succinate ER  (25MG Tablet ER 24HR, 1 Tablet ER 24HR Oral daily, Taken starting 11/10/2016) Active.  Lisinopril-Hydrochlorothiazide  (20-12.5MG Tablet, 1 Tablet Oral daily, Taken starting 10/17/2016) Active.  Omeprazole  (20MG Capsule DR, 1 Capsule DR Oral daily, Taken starting 09/20/2016) Active.    FH    HTN  DM  CKD    SH  single , no h/o smoking, alcohol or drug abuse    Allergies - NKDA    ROS    General Not Present- Fever, Night Sweats and Weight Loss > 10lbs..  HEENT Not Present- Hearing Loss, Nasal Congestion and Visual Disturbances.  Respiratory Not Present- Cough, Difficulty Breathing on Exertion and Wheezing.  Cardiovascular Not Present- Chest Pain, Palpitations and Shortness of Breath.  Gastrointestinal Not Present- Abdominal Pain, Constipation, Diarrhea and Heartburn.  Neurological Not Present- Headaches, Numbness and Weakness.  Psychiatric Not Present- Anxiety, Depression and Insomnia.      VS      PE    General  General Appearance - Cooperative, Well groomed and Consistent with stated age.  Build & Nutrition - Well nourished and Well developed.    Integumentary  General Characteristics  Color - normal coloration of skin.    Head and Neck  Neck  Global Assessment - supple.    Chest and Lung Exam  Chest and lung exam reveals  - normal excursion with symmetric chest walls, quiet, even and easy respiratory effort with no use of accessory muscles and on auscultation, normal breath sounds, no adventitious sounds and normal vocal resonance.    Cardiovascular  Auscultation  Rhythm - Regular. Heart Sounds - Normal heart sounds. Murmurs & Other Heart Sounds - Normal exam - No Murmurs.  Note:  syst murmur      Abdomen  Inspection  Normal Exam - No Visible peristalsis, No Abnormal pulsations, No Paradoxical movements and No Hernias. Skin - Normal exam - No Stria and No Scars.  Palpation/Percussion  Normal exam - Non Tender, No Rebound tenderness, No Rigidity (guarding), No Abnormal dullness to percussion and No Palpable abdominal masses.  Auscultation  Normal exam - Bowel sounds normal and No Abdominal bruits.    Peripheral Vascular  Lower Extremity  Palpation - Edema - Left - No edema. Right - No edema.    Neurologic  Motor  Tone - Normal.  Coordination - Normal.    Musculoskeletal  Lower Extremity  Ankle/Foot: Foot - Normal exam right foot - no swelling, edema or erythema of surrounding tissue. Normal exam left foot - no swelling, edema or erythema of surrounding tissue.    Labs    RESULTS:   Hematology:	    11-May-2017 16:23, Complete Blood Count + Automated Diff	  · WBC Count	  14.4	[3.8 - 10.5 K/uL]	  · RBC Count	  5.50	[3.80 - 5.20 M/uL]	  · Hemoglobin	  16.1	[11.5 - 15.5 g/dL]	  · Hematocrit	  48.1	[34.5 - 45.0 %]	  · Mean Cell Volume	87.4	[80.0 - 100.0 fl]	  · Mean Cell Hemoglobin	29.2	[27.0 - 34.0 pg]	  · Mean Cell Hemoglobin Conc	33.4	[32.0 - 36.0 gm/dL]	  · Red Cell Distrib Width	11.9	[10.3 - 14.5 %]	  · Platelet Count - Automated	188	[150 - 400 K/uL]	  · Auto Neutrophil #	  12.9	[1.8 - 7.4 K/uL]	  · Auto Lymphocyte #	  0.5	[1.0 - 3.3 K/uL]	  · Auto Monocyte #	0.9	[0.0 - 0.9 K/uL]	  · Auto Eosinophil #	0.1	[0.0 - 0.5 K/uL]	  · Auto Basophil #	0.1	[0.0 - 0.2 K/uL]	  · Auto Neutrophil %	  89.3	[43.0 - 77.0 %]	  Differential percentages must be correlated with absolute numbers for  clinical significance.	  · Auto Lymphocyte %	  3.3	[13.0 - 44.0 %]	  · Auto Monocyte %	6.1	[2.0 - 14.0 %]	  · Auto Eosinophil %	0.8	[0.0 - 6.0 %]	  · Auto Basophil %	0.4	[0.0 - 2.0 %]	  Urine:	    11-May-2017 16:23, Urinalysis	  · Color	Yellow	[Yellow]	  · Urine Appearance	Clear	[Clear]	  · Bilirubin	Negative	[Negative]	  · Ketone - Urine	  Trace	[Negative]	  · Specific Gravity	1.020	[1.010 - 1.025]	  · Protein, Urine	  15	[Negative]	  · Urobilinogen	Negative	[Negative]	  · Nitrite	Negative	[Negative]	  · Leukocyte Esterase Concentration	  Moderate	[Negative]	  · Blood, Urine	  Moderate	[Negative]	  · Glucose Qualitative, Urine	Negative	[Negative]	  · pH Urine	5.0	[5.0 - 8.0]	  Please Note: New Reference Range as of 4/18/17	      Imaging      CURRENT ORDERS/:   · 	sodium chloride 0.9% Bolus, 1000 milliLiter(s), IV Bolus, once, infuse over 1 Hour(s), Stop After 1 Doses  	Provider's Contact #: [phone], 11-May-2017, Active, 11-May-2017, Standard  · 	famotidine Injectable, [Known as PEPCID Injectable]  	20 milliGRAM(s), IV Push, once, Stop After 1 Doses  	Administration Instructions: Dilute to a total volume of 5 or 10 mL and push over 2 minutes  	Refrigerate  	Provider's Contact #: [phone], 11-May-2017, Active, Standard  · 	aluminum hydroxide/magnesium hydroxide/simethicone Suspension, 30 milliLiter(s), Oral, once, Stop After 1 Doses  	Provider's Contact #: [phone], 11-May-2017, Active, 11-May-2017, Standard  · 	sodium chloride 0.9% lock flush, 3 milliLiter(s), IV Push, once, Stop After 1 Doses, 11-May-2017, Active, 11-May-2017, Standard  · 	Saline Lock Insert.,   Time/Priority:  STAT, 11-May-2017, Active, Standard  · 	Vital Signs,   Frequency:  per routine, 11-May-2017, Active, Standard    Assessment/plan Internal Medicine    Chief complaint  Abdominal pain  HPI    61 y/o F pt with PMHx of Anxiety (on Zoloft and Sertraline 25mg), Heart Murmur, Herpes Simplex, HTN (controlled; on Metoprolol 25mg and Lisinopril 12.5mg), SBO, UTI, Gastritis, and Hypercholesterolemia (on Rosuvastatin 25mg) presents to ED c/o lower abd pain (cramping pain), general weakness, belching, loss of appetite, and soft stools (yellow color) since yesterday (19:00pm). Pt also states chills in ED, as well as a HA earlier today that was resolved with Tylenol. Pt reports that her presenting sx's do not feel similar to the sx's associated with her previous SBO. Pt denies fever, diarrhea, dysuria, burning with urination, urinary frequency, hematuria, or any other complaints. Pt also denies Hx of smoking, EtOH abuse/use, drug use/abuse, or Hx of similar sx's/episodes. Pt reports taking Imodium and Gas-X with no relief of sx's. NKDA.    In ER seen by surgical consult and admitted for r/o SBO under surgical service      PMH    Knee pain (719.46 | M25.569)    Hyperlipemia (272.4 | E78.5)    Benign essential hypertension (401.1 | I10)    Anxiety and depression (300.4 | F41.8)    GERD (gastroesophageal reflux disease) (530.81 | K21.9)    Hypertension (401.9 | I10)    PUD (peptic ulcer disease) (533.90 | K27.9)    Constipation    adverse reaction to cipro - diarrhea    Hypertension    Abnormal EKG (794.31 | R94.31)    Anxiety Disorder    Elevated serum creatinine (790.99 | R74.8)    h/o Herpes Zoster      Surgical history      Medx  Crestor  (5MG Tablet, 1 Tablet Oral daily, Taken starting 01/28/2017) Active.  Sertraline HCl  (25MG Tablet, 1 Tablet Oral daily, Taken starting 01/08/2017) Active.  Metoprolol Succinate ER  (25MG Tablet ER 24HR, 1 Tablet ER 24HR Oral daily, Taken starting 11/10/2016) Active.  Lisinopril-Hydrochlorothiazide  (20-12.5MG Tablet, 1 Tablet Oral daily, Taken starting 10/17/2016) Active.  Omeprazole  (20MG Capsule DR, 1 Capsule DR Oral daily, Taken starting 09/20/2016) Active.    FH    HTN  DM  CKD    SH  single , no h/o smoking, alcohol or drug abuse    Allergies - NKDA    ROS    General Not Present- Fever, Night Sweats and Weight Loss > 10lbs..  HEENT Not Present- Hearing Loss, Nasal Congestion and Visual Disturbances.  Respiratory Not Present- Cough, Difficulty Breathing on Exertion and Wheezing.  Cardiovascular Not Present- Chest Pain, Palpitations and Shortness of Breath.  Gastrointestinal Not Present- Abdominal Pain, Constipation, Diarrhea and Heartburn.  Neurological Not Present- Headaches, Numbness and Weakness.  Psychiatric Not Present- Anxiety, Depression and Insomnia.      VS    T(C): 37.1, Max: 38.1 (05-11 @ 23:27)  HR: 88 (87 - 105)  BP: 90/44 (90/44 - 112/59)  RR: 17 (16 - 20)  SpO2: 98% (98% - 100%)    PE    General  General Appearance - Cooperative, Well groomed and Consistent with stated age.  Build & Nutrition - Well nourished and Well developed.    Integumentary  General Characteristics  Color - normal coloration of skin.    Head and Neck  Neck  Global Assessment - supple.    Chest and Lung Exam  Chest and lung exam reveals  - normal excursion with symmetric chest walls, quiet, even and easy respiratory effort with no use of accessory muscles and on auscultation, normal breath sounds, no adventitious sounds and normal vocal resonance.    Cardiovascular  Auscultation  Rhythm - Regular. Heart Sounds - Normal heart sounds. Murmurs & Other Heart Sounds - Normal exam - No Murmurs.  Note:  syst murmur      Abdomen  Inspection  Normal Exam - No Visible peristalsis, No Abnormal pulsations, No Paradoxical movements and No Hernias. Skin - Normal exam - No Stria and No Scars.  Palpation/Percussion  Normal exam - Non Tender, No Rebound tenderness, No Rigidity (guarding), No Abnormal dullness to percussion and No Palpable abdominal masses.  Auscultation  Normal exam - Bowel sounds normal and No Abdominal bruits.    Peripheral Vascular  Lower Extremity  Palpation - Edema - Left - No edema. Right - No edema.    Neurologic  Motor  Tone - Normal.  Coordination - Normal.    Musculoskeletal  Lower Extremity  Ankle/Foot: Foot - Normal exam right foot - no swelling, edema or erythema of surrounding tissue. Normal exam left foot - no swelling, edema or erythema of surrounding tissue.    Labs    RESULTS:   Hematology:	    11-May-2017 16:23, Complete Blood Count + Automated Diff	  · WBC Count	  14.4	[3.8 - 10.5 K/uL]	  · RBC Count	  5.50	[3.80 - 5.20 M/uL]	  · Hemoglobin	  16.1	[11.5 - 15.5 g/dL]	  · Hematocrit	  48.1	[34.5 - 45.0 %]	  · Mean Cell Volume	87.4	[80.0 - 100.0 fl]	  · Mean Cell Hemoglobin	29.2	[27.0 - 34.0 pg]	  · Mean Cell Hemoglobin Conc	33.4	[32.0 - 36.0 gm/dL]	  · Red Cell Distrib Width	11.9	[10.3 - 14.5 %]	  · Platelet Count - Automated	188	[150 - 400 K/uL]	  · Auto Neutrophil #	  12.9	[1.8 - 7.4 K/uL]	  · Auto Lymphocyte #	  0.5	[1.0 - 3.3 K/uL]	  · Auto Monocyte #	0.9	[0.0 - 0.9 K/uL]	  · Auto Eosinophil #	0.1	[0.0 - 0.5 K/uL]	  · Auto Basophil #	0.1	[0.0 - 0.2 K/uL]	  · Auto Neutrophil %	  89.3	[43.0 - 77.0 %]	  Differential percentages must be correlated with absolute numbers for  clinical significance.	  · Auto Lymphocyte %	  3.3	[13.0 - 44.0 %]	  · Auto Monocyte %	6.1	[2.0 - 14.0 %]	  · Auto Eosinophil %	0.8	[0.0 - 6.0 %]	  · Auto Basophil %	0.4	[0.0 - 2.0 %]	  Urine:	    11-May-2017 16:23, Urinalysis	  · Color	Yellow	[Yellow]	  · Urine Appearance	Clear	[Clear]	  · Bilirubin	Negative	[Negative]	  · Ketone - Urine	  Trace	[Negative]	  · Specific Gravity	1.020	[1.010 - 1.025]	  · Protein, Urine	  15	[Negative]	  · Urobilinogen	Negative	[Negative]	  · Nitrite	Negative	[Negative]	  · Leukocyte Esterase Concentration	  Moderate	[Negative]	  · Blood, Urine	  Moderate	[Negative]	  · Glucose Qualitative, Urine	Negative	[Negative]	  · pH Urine	5.0	[5.0 - 8.0]	  Please Note: New Reference Range as of 4/18/17	      Imaging      CURRENT ORDERS/:   · 	sodium chloride 0.9% Bolus, 1000 milliLiter(s), IV Bolus, once, infuse over 1 Hour(s), Stop After 1 Doses  	Provider's Contact #: [phone], 11-May-2017, Active, 11-May-2017, Standard  · 	famotidine Injectable, [Known as PEPCID Injectable]  	20 milliGRAM(s), IV Push, once, Stop After 1 Doses  	Administration Instructions: Dilute to a total volume of 5 or 10 mL and push over 2 minutes  	Refrigerate  	Provider's Contact #: [phone], 11-May-2017, Active, Standard  · 	aluminum hydroxide/magnesium hydroxide/simethicone Suspension, 30 milliLiter(s), Oral, once, Stop After 1 Doses  	Provider's Contact #: [phone], 11-May-2017, Active, 11-May-2017, Standard  · 	sodium chloride 0.9% lock flush, 3 milliLiter(s), IV Push, once, Stop After 1 Doses, 11-May-2017, Active, 11-May-2017, Standard  · 	Saline Lock Insert.,   Time/Priority:  STAT, 11-May-2017, Active, Standard  · 	Vital Signs,   Frequency:  per routine, 11-May-2017, Active, Standard    Assessment/plan Internal Medicine    Chief complaint  Abdominal pain  HPI    63 y/o F pt with PMHx of Anxiety (on Zoloft and Sertraline 25mg), Heart Murmur, Herpes Simplex, HTN (controlled; on Metoprolol 25mg and Lisinopril 12.5mg), SBO, UTI, Gastritis, and Hypercholesterolemia (on Rosuvastatin 25mg) presents to ED c/o lower abd pain (cramping pain), general weakness, belching, loss of appetite, and soft stools (yellow color) since yesterday (19:00pm). Pt also states chills in ED, as well as a HA earlier today that was resolved with Tylenol. Pt reports that her presenting sx's do not feel similar to the sx's associated with her previous SBO. Pt denies fever, diarrhea, dysuria, burning with urination, urinary frequency, hematuria, or any other complaints. Pt also denies Hx of smoking, EtOH abuse/use, drug use/abuse, or Hx of similar sx's/episodes. Pt reports taking Imodium and Gas-X with no relief of sx's. NKDA.    In ER seen by surgical consult and admitted for r/o SBO under surgical service      PMH    Knee pain (719.46 | M25.569)    Hyperlipemia (272.4 | E78.5)    Benign essential hypertension (401.1 | I10)    Anxiety and depression (300.4 | F41.8)    GERD (gastroesophageal reflux disease) (530.81 | K21.9)    Hypertension (401.9 | I10)    PUD (peptic ulcer disease) (533.90 | K27.9)    Constipation    adverse reaction to cipro - diarrhea    Hypertension    Abnormal EKG (794.31 | R94.31)    Anxiety Disorder    Elevated serum creatinine (790.99 | R74.8)    h/o Herpes Zoster    h/o SBO  Stress Echo - neg  at [facility] - mild MR, normal EF,  mild LVH      Surgical history  no surgeries      Medx  Crestor  (5MG Tablet, 1 Tablet Oral daily, Taken starting 01/28/2017) Active.  Sertraline HCl  (25MG Tablet, 1 Tablet Oral daily, Taken starting 01/08/2017) Active.  Metoprolol Succinate ER  (25MG Tablet ER 24HR, 1 Tablet ER 24HR Oral daily, Taken starting 11/10/2016) Active.  Lisinopril-Hydrochlorothiazide  (20-12.5MG Tablet, 1 Tablet Oral daily, Taken starting 10/17/2016) Active.  Omeprazole  (20MG Capsule DR, 1 Capsule DR Oral daily, Taken starting 09/20/2016) Active.    FH  Father - prostate CA  HTN  DM  CKD    SH  single , no h/o smoking, alcohol or drug abuse    Allergies - NKDA    ROS    General Not Present- Fever, Night Sweats and Weight Loss > 10lbs..  HEENT Not Present- Hearing Loss, Nasal Congestion and Visual Disturbances.  Respiratory Not Present- Cough, Difficulty Breathing on Exertion and Wheezing.  Cardiovascular Not Present- Chest Pain, Palpitations and Shortness of Breath.  Gastrointestinal Not Present- Abdominal Pain, Constipation, Diarrhea and Heartburn.  Neurological Not Present- Headaches, Numbness and Weakness.  Psychiatric Not Present- Anxiety, Depression and Insomnia.      VS    T(C): 37.1, Max: 38.1 (05-11 @ 23:27)  HR: 88 (87 - 105)  BP: 90/44 (90/44 - 112/59)  RR: 17 (16 - 20)  SpO2: 98% (98% - 100%)    PE    General  General Appearance - Cooperative, Well groomed and Consistent with stated age.  Build & Nutrition - Well nourished and Well developed.    Integumentary  General Characteristics  Color - normal coloration of skin.    Head and Neck  Neck  Global Assessment - supple.    Chest and Lung Exam  Chest and lung exam reveals  - normal excursion with symmetric chest walls, quiet, even and easy respiratory effort with no use of accessory muscles and on auscultation, normal breath sounds, no adventitious sounds and normal vocal resonance.    Cardiovascular  Auscultation  Rhythm - Regular. Heart Sounds - Normal heart sounds. Murmurs & Other Heart Sounds - Normal exam - No Murmurs.  Note:  syst murmur      Abdomen  Inspection  Normal Exam - No Visible peristalsis, No Abnormal pulsations, No Paradoxical movements and No Hernias. Skin - Normal exam - No Stria and No Scars.  Palpation/Percussion  Normal exam - Non Tender, No Rebound tenderness, No Rigidity (guarding), No Abnormal dullness to percussion and No Palpable abdominal masses.  Auscultation  Normal exam - Bowel sounds normal and No Abdominal bruits.    Peripheral Vascular  Lower Extremity  Palpation - Edema - Left - No edema. Right - No edema.    Neurologic  Motor  Tone - Normal.  Coordination - Normal.    Musculoskeletal  Lower Extremity  Ankle/Foot: Foot - Normal exam right foot - no swelling, edema or erythema of surrounding tissue. Normal exam left foot - no swelling, edema or erythema of surrounding tissue.    Labs    RESULTS:   Hematology:	    11-May-2017 16:23, Complete Blood Count + Automated Diff	  · WBC Count	  14.4	[3.8 - 10.5 K/uL]	  · RBC Count	  5.50	[3.80 - 5.20 M/uL]	  · Hemoglobin	  16.1	[11.5 - 15.5 g/dL]	  · Hematocrit	  48.1	[34.5 - 45.0 %]	  · Mean Cell Volume	87.4	[80.0 - 100.0 fl]	  · Mean Cell Hemoglobin	29.2	[27.0 - 34.0 pg]	  · Mean Cell Hemoglobin Conc	33.4	[32.0 - 36.0 gm/dL]	  · Red Cell Distrib Width	11.9	[10.3 - 14.5 %]	  · Platelet Count - Automated	188	[150 - 400 K/uL]	  · Auto Neutrophil #	  12.9	[1.8 - 7.4 K/uL]	  · Auto Lymphocyte #	  0.5	[1.0 - 3.3 K/uL]	  · Auto Monocyte #	0.9	[0.0 - 0.9 K/uL]	  · Auto Eosinophil #	0.1	[0.0 - 0.5 K/uL]	  · Auto Basophil #	0.1	[0.0 - 0.2 K/uL]	  · Auto Neutrophil %	  89.3	[43.0 - 77.0 %]	  Differential percentages must be correlated with absolute numbers for  clinical significance.	  · Auto Lymphocyte %	  3.3	[13.0 - 44.0 %]	  · Auto Monocyte %	6.1	[2.0 - 14.0 %]	  · Auto Eosinophil %	0.8	[0.0 - 6.0 %]	  · Auto Basophil %	0.4	[0.0 - 2.0 %]	  Urine:	    11-May-2017 16:23, Urinalysis	  · Color	Yellow	[Yellow]	  · Urine Appearance	Clear	[Clear]	  · Bilirubin	Negative	[Negative]	  · Ketone - Urine	  Trace	[Negative]	  · Specific Gravity	1.020	[1.010 - 1.025]	  · Protein, Urine	  15	[Negative]	  · Urobilinogen	Negative	[Negative]	  · Nitrite	Negative	[Negative]	  · Leukocyte Esterase Concentration	  Moderate	[Negative]	  · Blood, Urine	  Moderate	[Negative]	  · Glucose Qualitative, Urine	Negative	[Negative]	  · pH Urine	5.0	[5.0 - 8.0]	  Please Note: New Reference Range as of 4/18/17	      Imaging    CT- Possible small   gallstones in the gallbladder neck. No evidence for thickened gallbladder   wall or pericholecystic fluid. In the pancreatic body, there is a 1.4 cm   cystic lesion. Multiple small nonspecific hypodense   lesions in the spleen, too small to characterize. Stable punctate   calcifications in the left adrenal. The right kidney and right adrenal   appear unremarkable. Tiny hypodense lesions in the left kidney, too small   to characterize.    Evaluation of the bowel structures is limited by lack of oral contrast.   The appendix appears normal. Small bowel dilatation is again noted with   an apparent transition point at the distal ileum in the right lower   quadrant, compatible with recurrence of small bowel   obstruction. Small ascites in the anterior abdomen and mesentery for   which associated bowel ischemia cannot be excluded. No gross evidence for   thickened bowel wall. No evidence for free air or enlarged lymph node.    CURRENT ORDERS/:   · 	sodium chloride 0.9% Bolus, 1000 milliLiter(s), IV Bolus, once, infuse over 1 Hour(s), Stop After 1 Doses  	Provider's Contact #: [phone], 11-May-2017, Active, 11-May-2017, Standard  · 	famotidine Injectable, [Known as PEPCID Injectable]  	20 milliGRAM(s), IV Push, once, Stop After 1 Doses  	Administration Instructions: Dilute to a total volume of 5 or 10 mL and push over 2 minutes  	Refrigerate  	Provider's Contact #: [phone], 11-May-2017, Active, Standard  · 	aluminum hydroxide/magnesium hydroxide/simethicone Suspension, 30 milliLiter(s), Oral, once, Stop After 1 Doses  	Provider's Contact #: [phone], 11-May-2017, Active, 11-May-2017, Standard  · 	sodium chloride 0.9% lock flush, 3 milliLiter(s), IV Push, once, Stop After 1 Doses, 11-May-2017, Active, 11-May-2017, Standard  · 	Saline Lock Insert.,   Time/Priority:  STAT, 11-May-2017, Active, Standard  · 	Vital Signs,   Frequency:  per routine, 11-May-2017, Active, Standard    Assessment/plan Internal Medicine    Chief complaint  Abdominal pain  HPI    61 y/o F pt with PMHx of Anxiety (on Zoloft and Sertraline 25mg), Heart Murmur, Herpes Simplex, HTN (controlled; on Metoprolol 25mg and Lisinopril 12.5mg), SBO, UTI, Gastritis, and Hypercholesterolemia (on Rosuvastatin 25mg) presents to ED c/o lower abd pain (cramping pain), general weakness, belching, loss of appetite, and soft stools (yellow color) since yesterday (19:00pm). Pt also states chills in ED, as well as a HA earlier today that was resolved with Tylenol. Pt reports that her presenting sx's do not feel similar to the sx's associated with her previous SBO. Pt denies fever, diarrhea, dysuria, burning with urination, urinary frequency, hematuria, or any other complaints. Pt also denies Hx of smoking, EtOH abuse/use, drug use/abuse, or Hx of similar sx's/episodes. Pt reports taking Imodium and Gas-X with no relief of sx's. NKDA.    In ER seen by surgical consult and admitted for r/o SBO under surgical service      PMH    Knee pain (719.46 | M25.569)    Hyperlipemia (272.4 | E78.5)    Benign essential hypertension (401.1 | I10)    Anxiety and depression (300.4 | F41.8)    GERD (gastroesophageal reflux disease) (530.81 | K21.9)    Hypertension (401.9 | I10)    PUD (peptic ulcer disease) (533.90 | K27.9)    Constipation    adverse reaction to cipro - diarrhea    Hypertension    Abnormal EKG (794.31 | R94.31)    Anxiety Disorder    Elevated serum creatinine (790.99 | R74.8)    h/o Herpes Zoster    h/o SBO  Stress Echo - neg  at [facility] - mild MR, normal EF,  mild LVH      Surgical history  no surgeries      Medx  Crestor  (5MG Tablet, 1 Tablet Oral daily, Taken starting 01/28/2017) Active.  Sertraline HCl  (25MG Tablet, 1 Tablet Oral daily, Taken starting 01/08/2017) Active.  Metoprolol Succinate ER  (25MG Tablet ER 24HR, 1 Tablet ER 24HR Oral daily, Taken starting 11/10/2016) Active.  Lisinopril-Hydrochlorothiazide  (20-12.5MG Tablet, 1 Tablet Oral daily, Taken starting 10/17/2016) Active.  Omeprazole  (20MG Capsule DR, 1 Capsule DR Oral daily, Taken starting 09/20/2016) Active.    FH  Father - prostate CA  HTN  DM  CKD    SH  single , no h/o smoking, alcohol or drug abuse    Allergies - NKDA    ROS    General Not Present- Fever, Night Sweats and Weight Loss > 10lbs..  HEENT Not Present- Hearing Loss, Nasal Congestion and Visual Disturbances.  Respiratory Not Present- Cough, Difficulty Breathing on Exertion and Wheezing.  Cardiovascular Not Present- Chest Pain, Palpitations and Shortness of Breath.  Gastrointestinal Not Present- Abdominal Pain, Constipation, Diarrhea and Heartburn.  Neurological Not Present- Headaches, Numbness and Weakness.  Psychiatric Not Present- Anxiety, Depression and Insomnia.      VS    T(C): 37.1, Max: 38.1 (05-11 @ 23:27)  HR: 88 (87 - 105)  BP: 90/44 (90/44 - 112/59)  RR: 17 (16 - 20)  SpO2: 98% (98% - 100%)    PE    General  General Appearance - Cooperative, Well groomed and Consistent with stated age.  Build & Nutrition - Well nourished and Well developed.    NGT is present    Integumentary  General Characteristics  Color - normal coloration of skin.    Head and Neck  Neck  Global Assessment - supple.    Chest and Lung Exam  Chest and lung exam reveals  - normal excursion with symmetric chest walls, quiet, even and easy respiratory effort with no use of accessory muscles and on auscultation, normal breath sounds, no adventitious sounds and normal vocal resonance.    Cardiovascular  Auscultation  Rhythm - Regular. Heart Sounds - Normal heart sounds. Murmurs & Other Heart Sounds - Normal exam - No Murmurs.  Note:  syst murmur      Abdomen  Inspection  Normal Exam - No Visible peristalsis, No Abnormal pulsations, No Paradoxical movements and No Hernias. Skin - Normal exam - No Stria and No Scars.  Palpation/Percussion  Normal exam - Non Tender, No Rebound tenderness, No Rigidity (guarding), No Abnormal dullness to percussion and No Palpable abdominal masses.  Auscultation  Normal exam - Bowel sounds normal and No Abdominal bruits.    Peripheral Vascular  Lower Extremity  Palpation - Edema - Left - No edema. Right - No edema.    Neurologic  Motor  Tone - Normal.  Coordination - Normal.    Musculoskeletal  Lower Extremity  Ankle/Foot: Foot - Normal exam right foot - no swelling, edema or erythema of surrounding tissue. Normal exam left foot - no swelling, edema or erythema of surrounding tissue.    Labs    RESULTS:   Hematology:	    11-May-2017 16:23, Complete Blood Count + Automated Diff	  · WBC Count	  14.4	[3.8 - 10.5 K/uL]	  · RBC Count	  5.50	[3.80 - 5.20 M/uL]	  · Hemoglobin	  16.1	[11.5 - 15.5 g/dL]	  · Hematocrit	  48.1	[34.5 - 45.0 %]	  · Mean Cell Volume	87.4	[80.0 - 100.0 fl]	  · Mean Cell Hemoglobin	29.2	[27.0 - 34.0 pg]	  · Mean Cell Hemoglobin Conc	33.4	[32.0 - 36.0 gm/dL]	  · Red Cell Distrib Width	11.9	[10.3 - 14.5 %]	  · Platelet Count - Automated	188	[150 - 400 K/uL]	  · Auto Neutrophil #	  12.9	[1.8 - 7.4 K/uL]	  · Auto Lymphocyte #	  0.5	[1.0 - 3.3 K/uL]	  · Auto Monocyte #	0.9	[0.0 - 0.9 K/uL]	  · Auto Eosinophil #	0.1	[0.0 - 0.5 K/uL]	  · Auto Basophil #	0.1	[0.0 - 0.2 K/uL]	  · Auto Neutrophil %	  89.3	[43.0 - 77.0 %]	  Differential percentages must be correlated with absolute numbers for  clinical significance.	  · Auto Lymphocyte %	  3.3	[13.0 - 44.0 %]	  · Auto Monocyte %	6.1	[2.0 - 14.0 %]	  · Auto Eosinophil %	0.8	[0.0 - 6.0 %]	  · Auto Basophil %	0.4	[0.0 - 2.0 %]	  Urine:	    11-May-2017 16:23, Urinalysis	  · Color	Yellow	[Yellow]	  · Urine Appearance	Clear	[Clear]	  · Bilirubin	Negative	[Negative]	  · Ketone - Urine	  Trace	[Negative]	  · Specific Gravity	1.020	[1.010 - 1.025]	  · Protein, Urine	  15	[Negative]	  · Urobilinogen	Negative	[Negative]	  · Nitrite	Negative	[Negative]	  · Leukocyte Esterase Concentration	  Moderate	[Negative]	  · Blood, Urine	  Moderate	[Negative]	  · Glucose Qualitative, Urine	Negative	[Negative]	  · pH Urine	5.0	[5.0 - 8.0]	  Please Note: New Reference Range as of 4/18/17	      Imaging    CT- Possible small   gallstones in the gallbladder neck. No evidence for thickened gallbladder   wall or pericholecystic fluid. In the pancreatic body, there is a 1.4 cm   cystic lesion. Multiple small nonspecific hypodense   lesions in the spleen, too small to characterize. Stable punctate   calcifications in the left adrenal. The right kidney and right adrenal   appear unremarkable. Tiny hypodense lesions in the left kidney, too small   to characterize.    Evaluation of the bowel structures is limited by lack of oral contrast.   The appendix appears normal. Small bowel dilatation is again noted with   an apparent transition point at the distal ileum in the right lower   quadrant, compatible with recurrence of small bowel   obstruction. Small ascites in the anterior abdomen and mesentery for   which associated bowel ischemia cannot be excluded. No gross evidence for   thickened bowel wall. No evidence for free air or enlarged lymph node.    CURRENT ORDERS/:   · 	sodium chloride 0.9% Bolus, 1000 milliLiter(s), IV Bolus, once, infuse over 1 Hour(s), Stop After 1 Doses  	Provider's Contact #: [phone], 11-May-2017, Active, 11-May-2017, Standard  · 	famotidine Injectable, [Known as PEPCID Injectable]  	20 milliGRAM(s), IV Push, once, Stop After 1 Doses  	Administration Instructions: Dilute to a total volume of 5 or 10 mL and push over 2 minutes  	Refrigerate  	Provider's Contact #: [phone], 11-May-2017, Active, Standard  · 	aluminum hydroxide/magnesium hydroxide/simethicone Suspension, 30 milliLiter(s), Oral, once, Stop After 1 Doses  	Provider's Contact #: [phone], 11-May-2017, Active, 11-May-2017, Standard  · 	sodium chloride 0.9% lock flush, 3 milliLiter(s), IV Push, once, Stop After 1 Doses, 11-May-2017, Active, 11-May-2017, Standard  · 	Saline Lock Insert.,   Time/Priority:  STAT, 11-May-2017, Active, Standard  · 	Vital Signs,   Frequency:  per routine, 11-May-2017, Active, Standard    Assessment/plan  63 yo female with crampy abdominal pain and abn CT of abd indicative of SBO. Surgical team is following the pt and asked for medical evaluation for possible surgery.    No clinical predictors for risk of perioperative complications: no MI, no unstable angina, no decompensated CHF, no severe valvular heart disease.  Functional capacity more than 4 mets.   HTN is controlled. Pt is getting IV medx for BP. Hold if SBP is below 110.  Considering risk factors and h/o abn EKG cardiology consult to evaluate perioperative cardiac risk was called. Dr Rosas is aware.  Risk for pulmonary complications is low.  Prevention of wound infection as per surgical team. Elevated WBC -  pt is already on IV Antibiotics.  GI and DVT prophylaxis Internal Medicine    Chief complaint  Abdominal pain  HPI    63 y/o F pt with PMHx of Anxiety (on Zoloft and Sertraline 25mg), Heart Murmur, Herpes Simplex, HTN (controlled; on Metoprolol 25mg and Lisinopril 12.5mg), SBO, UTI, Gastritis, and Hypercholesterolemia (on Rosuvastatin 25mg) presents to ED c/o lower abd pain (cramping pain), general weakness, belching, loss of appetite, and soft stools (yellow color) since yesterday (19:00pm). Pt also states chills in ED, as well as a HA earlier today that was resolved with Tylenol. Pt reports that her presenting sx's do not feel similar to the sx's associated with her previous SBO. Pt denies fever, diarrhea, dysuria, burning with urination, urinary frequency, hematuria, or any other complaints. Pt also denies Hx of smoking, EtOH abuse/use, drug use/abuse, or Hx of similar sx's/episodes. Pt reports taking Imodium and Gas-X with no relief of sx's. NKDA.    In ER seen by surgical consult and admitted for r/o SBO under surgical service      PMH    Knee pain (719.46 | M25.569)    Hyperlipemia (272.4 | E78.5)    Benign essential hypertension (401.1 | I10)    Anxiety and depression (300.4 | F41.8)    GERD (gastroesophageal reflux disease) (530.81 | K21.9)    Hypertension (401.9 | I10)    PUD (peptic ulcer disease) (533.90 | K27.9)    Constipation    adverse reaction to cipro - diarrhea    Hypertension    Abnormal EKG (794.31 | R94.31)    Anxiety Disorder    Elevated serum creatinine (790.99 | R74.8)    h/o Herpes Zoster    h/o SBO  Stress Echo - neg  at [facility] - mild MR, normal EF,  mild LVH      Surgical history  no surgeries      Medx  Crestor  (5MG Tablet, 1 Tablet Oral daily, Taken starting 01/28/2017) Active.  Sertraline HCl  (25MG Tablet, 1 Tablet Oral daily, Taken starting 01/08/2017) Active.  Metoprolol Succinate ER  (25MG Tablet ER 24HR, 1 Tablet ER 24HR Oral daily, Taken starting 11/10/2016) Active.  Lisinopril-Hydrochlorothiazide  (20-12.5MG Tablet, 1 Tablet Oral daily, Taken starting 10/17/2016) Active.  Omeprazole  (20MG Capsule DR, 1 Capsule DR Oral daily, Taken starting 09/20/2016) Active.    FH  Father - prostate CA  HTN  DM  CKD    SH  single , no h/o smoking, alcohol or drug abuse    Allergies - NKDA    ROS    General Not Present- Fever, Night Sweats and Weight Loss > 10lbs..  HEENT Not Present- Hearing Loss, Nasal Congestion and Visual Disturbances.  Respiratory Not Present- Cough, Difficulty Breathing on Exertion and Wheezing.  Cardiovascular Not Present- Chest Pain, Palpitations and Shortness of Breath.  Gastrointestinal Not Present- Abdominal Pain, Constipation, Diarrhea and Heartburn.  Neurological Not Present- Headaches, Numbness and Weakness.  Psychiatric Not Present- Anxiety, Depression and Insomnia.      VS    T(C): 37.1, Max: 38.1 (05-11 @ 23:27)  HR: 88 (87 - 105)  BP: 90/44 (90/44 - 112/59)  RR: 17 (16 - 20)  SpO2: 98% (98% - 100%)    PE    General  General Appearance - Cooperative, Well groomed and Consistent with stated age.  Build & Nutrition - Well nourished and Well developed.    NGT is present    Integumentary  General Characteristics  Color - normal coloration of skin.    Head and Neck  Neck  Global Assessment - supple.    Chest and Lung Exam  Chest and lung exam reveals  - normal excursion with symmetric chest walls, quiet, even and easy respiratory effort with no use of accessory muscles and on auscultation, normal breath sounds, no adventitious sounds and normal vocal resonance.    Cardiovascular  Auscultation  Rhythm - Regular. Heart Sounds - Normal heart sounds. Murmurs & Other Heart Sounds - Normal exam - No Murmurs.  Note:  syst murmur      Abdomen  Inspection  Normal Exam - No Visible peristalsis, No Abnormal pulsations, No Paradoxical movements and No Hernias. Skin - Normal exam - No Stria and No Scars.  Palpation/Percussion  Normal exam - Non Tender, No Rebound tenderness, No Rigidity (guarding), No Abnormal dullness to percussion and No Palpable abdominal masses.  Auscultation  Normal exam - Bowel sounds normal and No Abdominal bruits.    Peripheral Vascular  Lower Extremity  Palpation - Edema - Left - No edema. Right - No edema.    Neurologic  Motor  Tone - Normal.  Coordination - Normal.    Musculoskeletal  Lower Extremity  Ankle/Foot: Foot - Normal exam right foot - no swelling, edema or erythema of surrounding tissue. Normal exam left foot - no swelling, edema or erythema of surrounding tissue.    Labs    RESULTS:   Hematology:	    11-May-2017 16:23, Complete Blood Count + Automated Diff	  · WBC Count	  14.4	[3.8 - 10.5 K/uL]	  · RBC Count	  5.50	[3.80 - 5.20 M/uL]	  · Hemoglobin	  16.1	[11.5 - 15.5 g/dL]	  · Hematocrit	  48.1	[34.5 - 45.0 %]	  · Mean Cell Volume	87.4	[80.0 - 100.0 fl]	  · Mean Cell Hemoglobin	29.2	[27.0 - 34.0 pg]	  · Mean Cell Hemoglobin Conc	33.4	[32.0 - 36.0 gm/dL]	  · Red Cell Distrib Width	11.9	[10.3 - 14.5 %]	  · Platelet Count - Automated	188	[150 - 400 K/uL]	  · Auto Neutrophil #	  12.9	[1.8 - 7.4 K/uL]	  · Auto Lymphocyte #	  0.5	[1.0 - 3.3 K/uL]	  · Auto Monocyte #	0.9	[0.0 - 0.9 K/uL]	  · Auto Eosinophil #	0.1	[0.0 - 0.5 K/uL]	  · Auto Basophil #	0.1	[0.0 - 0.2 K/uL]	  · Auto Neutrophil %	  89.3	[43.0 - 77.0 %]	  Differential percentages must be correlated with absolute numbers for  clinical significance.	  · Auto Lymphocyte %	  3.3	[13.0 - 44.0 %]	  · Auto Monocyte %	6.1	[2.0 - 14.0 %]	  · Auto Eosinophil %	0.8	[0.0 - 6.0 %]	  · Auto Basophil %	0.4	[0.0 - 2.0 %]	  Urine:	    11-May-2017 16:23, Urinalysis	  · Color	Yellow	[Yellow]	  · Urine Appearance	Clear	[Clear]	  · Bilirubin	Negative	[Negative]	  · Ketone - Urine	  Trace	[Negative]	  · Specific Gravity	1.020	[1.010 - 1.025]	  · Protein, Urine	  15	[Negative]	  · Urobilinogen	Negative	[Negative]	  · Nitrite	Negative	[Negative]	  · Leukocyte Esterase Concentration	  Moderate	[Negative]	  · Blood, Urine	  Moderate	[Negative]	  · Glucose Qualitative, Urine	Negative	[Negative]	  · pH Urine	5.0	[5.0 - 8.0]	  Please Note: New Reference Range as of 4/18/17	    CMP - reviewed  Imaging    CT- Possible small   gallstones in the gallbladder neck. No evidence for thickened gallbladder   wall or pericholecystic fluid. In the pancreatic body, there is a 1.4 cm   cystic lesion. Multiple small nonspecific hypodense   lesions in the spleen, too small to characterize. Stable punctate   calcifications in the left adrenal. The right kidney and right adrenal   appear unremarkable. Tiny hypodense lesions in the left kidney, too small   to characterize.    Evaluation of the bowel structures is limited by lack of oral contrast.   The appendix appears normal. Small bowel dilatation is again noted with   an apparent transition point at the distal ileum in the right lower   quadrant, compatible with recurrence of small bowel   obstruction. Small ascites in the anterior abdomen and mesentery for   which associated bowel ischemia cannot be excluded. No gross evidence for   thickened bowel wall. No evidence for free air or enlarged lymph node.    CURRENT ORDERS/:   · 	sodium chloride 0.9% Bolus, 1000 milliLiter(s), IV Bolus, once, infuse over 1 Hour(s), Stop After 1 Doses  	Provider's Contact #: [phone], 11-May-2017, Active, 11-May-2017, Standard  · 	famotidine Injectable, [Known as PEPCID Injectable]  	20 milliGRAM(s), IV Push, once, Stop After 1 Doses  	Administration Instructions: Dilute to a total volume of 5 or 10 mL and push over 2 minutes  	Refrigerate  	Provider's Contact #: [phone], 11-May-2017, Active, Standard  · 	aluminum hydroxide/magnesium hydroxide/simethicone Suspension, 30 milliLiter(s), Oral, once, Stop After 1 Doses  	Provider's Contact #: [phone], 11-May-2017, Active, 11-May-2017, Standard  · 	sodium chloride 0.9% lock flush, 3 milliLiter(s), IV Push, once, Stop After 1 Doses, 11-May-2017, Active, 11-May-2017, Standard  · 	Saline Lock Insert.,   Time/Priority:  STAT, 11-May-2017, Active, Standard  · 	Vital Signs,   Frequency:  per routine, 11-May-2017, Active, Standard    Assessment/plan  63 yo female with crampy abdominal pain and abn CT of abd indicative of SBO. Surgical team is following the pt and asked for medical evaluation for possible surgery.    No clinical predictors for risk of perioperative cardiac complications: no MI, no unstable angina, no decompensated CHF, no severe valvular heart disease.  Functional capacity more than 4 mets.  HTN is controlled. Pt is getting IV medx for BP. Hold if SBP is below 110.  Considering risk factors and h/o abn EKG cardiology consult to evaluate perioperative cardiac risk was called. Dr Rosas is aware.  Risk for pulmonary complications is low.  Prevention of wound infection as per surgical team. Elevated WBC -  pt is already on IV Antibiotics.  GI and DVT prophylaxis  CKD st 3 - GFR 52, Cr is at baseline/ on IV fluids for NPO - monitor BUN/Cr, avoid nephrotoxic medx

## 2017-05-13 ENCOUNTER — TRANSCRIPTION ENCOUNTER (OUTPATIENT)
Age: 62
End: 2017-05-13

## 2017-05-13 DIAGNOSIS — E87.6 HYPOKALEMIA: ICD-10-CM

## 2017-05-13 LAB
ANION GAP SERPL CALC-SCNC: 6 MMOL/L — SIGNIFICANT CHANGE UP (ref 5–17)
BUN SERPL-MCNC: 15 MG/DL — SIGNIFICANT CHANGE UP (ref 7–18)
CALCIUM SERPL-MCNC: 7.5 MG/DL — LOW (ref 8.4–10.5)
CHLORIDE SERPL-SCNC: 115 MMOL/L — HIGH (ref 96–108)
CO2 SERPL-SCNC: 27 MMOL/L — SIGNIFICANT CHANGE UP (ref 22–31)
CREAT SERPL-MCNC: 1.02 MG/DL — SIGNIFICANT CHANGE UP (ref 0.5–1.3)
GLUCOSE SERPL-MCNC: 117 MG/DL — HIGH (ref 70–99)
HCT VFR BLD CALC: 32.9 % — LOW (ref 34.5–45)
HGB BLD-MCNC: 11.3 G/DL — LOW (ref 11.5–15.5)
MCHC RBC-ENTMCNC: 29.8 PG — SIGNIFICANT CHANGE UP (ref 27–34)
MCHC RBC-ENTMCNC: 34.2 GM/DL — SIGNIFICANT CHANGE UP (ref 32–36)
MCV RBC AUTO: 87.2 FL — SIGNIFICANT CHANGE UP (ref 80–100)
PLATELET # BLD AUTO: 137 K/UL — LOW (ref 150–400)
POTASSIUM SERPL-MCNC: 3.3 MMOL/L — LOW (ref 3.5–5.3)
POTASSIUM SERPL-SCNC: 3.3 MMOL/L — LOW (ref 3.5–5.3)
RBC # BLD: 3.77 M/UL — LOW (ref 3.8–5.2)
RBC # FLD: 12 % — SIGNIFICANT CHANGE UP (ref 10.3–14.5)
SODIUM SERPL-SCNC: 148 MMOL/L — HIGH (ref 135–145)
WBC # BLD: 4.2 K/UL — SIGNIFICANT CHANGE UP (ref 3.8–10.5)
WBC # FLD AUTO: 4.2 K/UL — SIGNIFICANT CHANGE UP (ref 3.8–10.5)

## 2017-05-13 RX ORDER — POTASSIUM CHLORIDE 20 MEQ
40 PACKET (EA) ORAL ONCE
Qty: 0 | Refills: 0 | Status: COMPLETED | OUTPATIENT
Start: 2017-05-13 | End: 2017-05-13

## 2017-05-13 RX ORDER — METOPROLOL TARTRATE 50 MG
25 TABLET ORAL
Qty: 0 | Refills: 0 | Status: DISCONTINUED | OUTPATIENT
Start: 2017-05-13 | End: 2017-05-14

## 2017-05-13 RX ADMIN — Medication 25 MILLIGRAM(S): at 17:48

## 2017-05-13 RX ADMIN — PANTOPRAZOLE SODIUM 40 MILLIGRAM(S): 20 TABLET, DELAYED RELEASE ORAL at 14:31

## 2017-05-13 RX ADMIN — HEPARIN SODIUM 5000 UNIT(S): 5000 INJECTION INTRAVENOUS; SUBCUTANEOUS at 22:03

## 2017-05-13 RX ADMIN — HEPARIN SODIUM 5000 UNIT(S): 5000 INJECTION INTRAVENOUS; SUBCUTANEOUS at 05:37

## 2017-05-13 RX ADMIN — Medication 40 MILLIEQUIVALENT(S): at 09:05

## 2017-05-13 RX ADMIN — CEFTRIAXONE 100 GRAM(S): 500 INJECTION, POWDER, FOR SOLUTION INTRAMUSCULAR; INTRAVENOUS at 09:05

## 2017-05-13 RX ADMIN — HEPARIN SODIUM 5000 UNIT(S): 5000 INJECTION INTRAVENOUS; SUBCUTANEOUS at 14:31

## 2017-05-13 NOTE — DISCHARGE NOTE ADULT - PLAN OF CARE
Capsular endoscopy with gastroenterology Please follow up with Dr. Conde in the office in 2 weeks or as needed. Please call and make an appointment. You may eat a regular diet and please remain active and ambulatory. You may shower. Please follow up with your gastroenterologist (Dr. Encarnacion) for a capsular endoscopy to see why you keep getting these small bowel obstructions.

## 2017-05-13 NOTE — DISCHARGE NOTE ADULT - PATIENT PORTAL LINK FT
“You can access the [employer] Patient Portal, offered by [facility], by registering with the following website: [email or web address]”

## 2017-05-13 NOTE — PROGRESS NOTE ADULT - SUBJECTIVE AND OBJECTIVE BOX
Pt was examined at bedside  No complaints of chest pain or SOB  Diarrhea - improving, no abd pain, no nausea or vomiting, no fever  VS - stable  Appearance: Normal	  HEENT:   Normal oral mucosa, PERRL, EOMI	  Lymphatic: No lymphadenopathy  Cardiovascular: Normal S1 S2, No JVD, No murmurs, No edema  Respiratory: Lungs clear to auscultation	  Psychiatry: A & O x 3, Mood & affect appropriate  Gastrointestinal:  Soft, Non-tender, + BS	  Skin: No rashes, No ecchymoses, No cyanosis	  Neurologic: Non-focal  Extremities: Normal range of motion, No clubbing, cyanosis or edema  Vascular: Peripheral pulses palpable 2+ bilaterally    LABS:	 	                          11.3   4.2   )-----------( 137      ( 13 May 2017 08:05 )             32.9     05-13    148<H>  |  115<H>  |  15  ----------------------------<  117<H>  3.3<L>   |  27  |  1.02    Ca    7.5<L>      13 May 2017 08:05    TPro  8.5<H>  /  Alb  3.6  /  TBili  1.2  /  DBili  x   /  AST  51<H>  /  ALT  25  /  AlkPhos  60  05-11    Echocardiogram   CONCLUSIONS:  1. Normal mitral valve.  2. Normal trileaflet aortic valve.  3. Normal aortic root.  4. Mild left atrial enlargement.  5. Normal left ventricular internal dimensions and wall  thicknesses.  6. Normal left ventricular function.  7. Normal diastolic function  8. Normal right atrium.  9. Normal right ventricular size and function.  10. RVS Pressure is 29 mm Hg.  11. There is mild tricuspid regurgitation.  12. There is mild pulmonic regurgitation.  13. Normal pericardium with no pericardial effusion.  	  Imp/plan  partial SBO - resolving. Tolerating PO diet    pt will be discharged home tomorrow as per surgical note    Anb EKG will be followed by cardiology as outpt for stress test    HTN - monitor BP, restart ALECIA medx by mouth

## 2017-05-13 NOTE — PROGRESS NOTE ADULT - SUBJECTIVE AND OBJECTIVE BOX
CHIEF COMPLAINT:Patient is a 62y old  Female who presents with a chief complaint of Abdominal Pain .Pt still having diarrhea from contrast,tolerating regular diet.    	  REVIEW OF SYSTEMS:  CONSTITUTIONAL: No fever, weight loss, or fatigue  EYES: No eye pain, visual disturbances, or discharge  ENT:  No difficulty hearing, tinnitus, vertigo; No sinus or throat pain  NECK: No pain or stiffness  RESPIRATORY: No cough, wheezing, chills or hemoptysis; No Shortness of Breath  CARDIOVASCULAR: No chest pain, palpitations, passing out, dizziness, or leg swelling  GASTROINTESTINAL: No abdominal or epigastric pain. No nausea, vomiting, or hematemesis; + diarrhea. No melena or hematochezia.  GENITOURINARY: No dysuria, frequency, hematuria, or incontinence  NEUROLOGICAL: No headaches, memory loss, loss of strength, numbness, or tremors  SKIN: No itching, burning, rashes, or lesions   LYMPH Nodes: No enlarged glands  ENDOCRINE: No heat or cold intolerance; No hair loss  MUSCULOSKELETAL: No joint pain or swelling; No muscle, back, or extremity pain  PSYCHIATRIC: No depression, anxiety, mood swings, or difficulty sleeping  HEME/LYMPH: No easy bruising, or bleeding gums  ALLERGY AND IMMUNOLOGIC: No hives or eczema	    PHYSICAL EXAM:  T(C): 36.9, Max: 36.9 (05-13 @ 05:56)  HR: 79 (78 - 86)  BP: 99/49 (99/49 - 111/59)  RR: 16 (15 - 20)  SpO2: 97% (97% - 100%)  Wt(kg): --  I&O's Summary    I & Os for current day (as of 13 May 2017 12:29)  =============================================  IN: 0 ml / OUT: 501 ml / NET: -501 ml      Appearance: Normal	  HEENT:   Normal oral mucosa, PERRL, EOMI	  Lymphatic: No lymphadenopathy  Cardiovascular: Normal S1 S2, No JVD, No murmurs, No edema  Respiratory: Lungs clear to auscultation	  Psychiatry: A & O x 3, Mood & affect appropriate  Gastrointestinal:  Soft, Non-tender, + BS	  Skin: No rashes, No ecchymoses, No cyanosis	  Neurologic: Non-focal  Extremities: Normal range of motion, No clubbing, cyanosis or edema  Vascular: Peripheral pulses palpable 2+ bilaterally    MEDICATIONS  (STANDING):  dextrose 5% + sodium chloride 0.9%. 1000milliLiter(s) IV Continuous <Continuous>  heparin  Injectable 5000Unit(s) SubCutaneous every 8 hours  pantoprazole  Injectable 40milliGRAM(s) IV Push daily  metoprolol Injectable 2.5milliGRAM(s) IV Push every 6 hours  enalaprilat Injectable 1.25milliGRAM(s) IV Push every 6 hours  cefTRIAXone   IVPB  IV Intermittent   cefTRIAXone   IVPB 1Gram(s) IV Intermittent every 24 hours      LABS:	 	                          11.3   4.2   )-----------( 137      ( 13 May 2017 08:05 )             32.9     05-13    148<H>  |  115<H>  |  15  ----------------------------<  117<H>  3.3<L>   |  27  |  1.02    Ca    7.5<L>      13 May 2017 08:05    TPro  8.5<H>  /  Alb  3.6  /  TBili  1.2  /  DBili  x   /  AST  51<H>  /  ALT  25  /  AlkPhos  60  05-11    Echocardiogram   CONCLUSIONS:  1. Normal mitral valve.  2. Normal trileaflet aortic valve.  3. Normal aortic root.  4. Mild left atrial enlargement.  5. Normal left ventricular internal dimensions and wall  thicknesses.  6. Normal left ventricular function.  7. Normal diastolic function  8. Normal right atrium.  9. Normal right ventricular size and function.  10. RVS Pressure is 29 mm Hg.  11. There is mild tricuspid regurgitation.  12. There is mild pulmonic regurgitation.  13. Normal pericardium with no pericardial effusion.

## 2017-05-13 NOTE — PROGRESS NOTE ADULT - SUBJECTIVE AND OBJECTIVE BOX
Patient examined at bedside, no complaints.   No nausea, no vomiting, +BMs, +flatus  Tolerating diet    T(F): 98.5, Max: 98.5 (05-13 @ 05:56)  HR: 79 (78 - 86)  BP: 99/49 (99/49 - 111/59)  RR: 16 (15 - 20)  SpO2: 97% (97% - 100%)  Wt(kg): --    Labs:                       11.3   4.2   )-----------( 137      ( 13 May 2017 08:05 )             32.9     05-13    148<H>  |  115<H>  |  15  ----------------------------<  117<H>  3.3<L>   |  27  |  1.02    Ca    7.5<L>      13 May 2017 08:05    TPro  8.5<H>  /  Alb  3.6  /  TBili  1.2  /  DBili  x   /  AST  51<H>  /  ALT  25  /  AlkPhos  60  05-11    Physical Exam  General: AAOx3, No acute distress  Skin: No jaundice, no icterus  Abdomen: soft, nontender, nondistended, no rebound tenderness, no guarding, no palpable masses, nontympanic  Extremities: non edematous, no calf pain bilaterally

## 2017-05-13 NOTE — DISCHARGE NOTE ADULT - HOSPITAL COURSE
Admitted on 5/11/17 with a small bowel obstruction. CT scan confirmed a small bowel obstruction.  She was admitted, treated conservatively with NPO and an NG tube.  He bowel function returned and he pain resolved.   Small bowel series done on 5/12 showed a partial SBO.  She tolerated clears and was advanced to a regular diet on 5/13.  Once tolerating the regular diet, she was discharged home

## 2017-05-13 NOTE — PROGRESS NOTE ADULT - PROBLEM SELECTOR PLAN 1
1. Regular diet  2. d/c planning of tolerates diet  3. o/p follow up with Dr. Encarnacion (her GI Doctor) for capsular endoscopy

## 2017-05-13 NOTE — DISCHARGE NOTE ADULT - CARE PLAN
Principal Discharge DX:	SBO (small bowel obstruction)  Goal:	Capsular endoscopy with gastroenterology  Instructions for follow-up, activity and diet:	Please follow up with Dr. Conde in the office in 2 weeks or as needed. Please call and make an appointment. You may eat a regular diet and please remain active and ambulatory. You may shower. Please follow up with your gastroenterologist (Dr. Encarnacion) for a capsular endoscopy to see why you keep getting these small bowel obstructions.

## 2017-05-13 NOTE — DISCHARGE NOTE ADULT - CARE PROVIDER_API CALL
Michael Conde (MD), Surgery  [address] [place] [address]  Phone: [phone]  Fax: [phone]

## 2017-05-14 VITALS
RESPIRATION RATE: 16 BRPM | TEMPERATURE: 99 F | HEART RATE: 80 BPM | DIASTOLIC BLOOD PRESSURE: 49 MMHG | OXYGEN SATURATION: 96 % | SYSTOLIC BLOOD PRESSURE: 94 MMHG

## 2017-05-14 PROCEDURE — 85027 COMPLETE CBC AUTOMATED: CPT

## 2017-05-14 PROCEDURE — 74177 CT ABD & PELVIS W/CONTRAST: CPT

## 2017-05-14 PROCEDURE — 80053 COMPREHEN METABOLIC PANEL: CPT

## 2017-05-14 PROCEDURE — 81001 URINALYSIS AUTO W/SCOPE: CPT

## 2017-05-14 PROCEDURE — 74250 X-RAY XM SM INT 1CNTRST STD: CPT

## 2017-05-14 PROCEDURE — 93005 ELECTROCARDIOGRAM TRACING: CPT

## 2017-05-14 PROCEDURE — 96374 THER/PROPH/DIAG INJ IV PUSH: CPT | Mod: 59

## 2017-05-14 PROCEDURE — 99285 EMERGENCY DEPT VISIT HI MDM: CPT | Mod: 25

## 2017-05-14 PROCEDURE — 80048 BASIC METABOLIC PNL TOTAL CA: CPT

## 2017-05-14 PROCEDURE — 93306 TTE W/DOPPLER COMPLETE: CPT

## 2017-05-14 PROCEDURE — 71045 X-RAY EXAM CHEST 1 VIEW: CPT

## 2017-05-14 PROCEDURE — 83690 ASSAY OF LIPASE: CPT

## 2017-05-14 RX ADMIN — HEPARIN SODIUM 5000 UNIT(S): 5000 INJECTION INTRAVENOUS; SUBCUTANEOUS at 05:44

## 2017-05-14 RX ADMIN — CEFTRIAXONE 100 GRAM(S): 500 INJECTION, POWDER, FOR SOLUTION INTRAMUSCULAR; INTRAVENOUS at 08:23

## 2017-05-16 DIAGNOSIS — R94.31 ABNORMAL ELECTROCARDIOGRAM [ECG] [EKG]: ICD-10-CM

## 2017-05-16 DIAGNOSIS — Z87.898 PERSONAL HISTORY OF OTHER SPECIFIED CONDITIONS: ICD-10-CM

## 2017-05-16 DIAGNOSIS — I10 ESSENTIAL (PRIMARY) HYPERTENSION: ICD-10-CM

## 2017-05-16 DIAGNOSIS — B00.9 HERPESVIRAL INFECTION, UNSPECIFIED: ICD-10-CM

## 2017-05-16 DIAGNOSIS — K55.9 VASCULAR DISORDER OF INTESTINE, UNSPECIFIED: ICD-10-CM

## 2017-05-16 DIAGNOSIS — F41.9 ANXIETY DISORDER, UNSPECIFIED: ICD-10-CM

## 2017-05-16 DIAGNOSIS — K59.00 CONSTIPATION, UNSPECIFIED: ICD-10-CM

## 2017-05-16 DIAGNOSIS — E78.00 PURE HYPERCHOLESTEROLEMIA, UNSPECIFIED: ICD-10-CM

## 2017-05-16 DIAGNOSIS — K27.9 PEPTIC ULCER, SITE UNSPECIFIED, UNSPECIFIED AS ACUTE OR CHRONIC, WITHOUT HEMORRHAGE OR PERFORATION: ICD-10-CM

## 2017-05-16 DIAGNOSIS — N39.0 URINARY TRACT INFECTION, SITE NOT SPECIFIED: ICD-10-CM

## 2017-05-16 DIAGNOSIS — K80.20 CALCULUS OF GALLBLADDER WITHOUT CHOLECYSTITIS WITHOUT OBSTRUCTION: ICD-10-CM

## 2017-05-16 DIAGNOSIS — Z86.19 PERSONAL HISTORY OF OTHER INFECTIOUS AND PARASITIC DISEASES: ICD-10-CM

## 2017-05-16 DIAGNOSIS — K29.70 GASTRITIS, UNSPECIFIED, WITHOUT BLEEDING: ICD-10-CM

## 2017-05-16 DIAGNOSIS — R18.8 OTHER ASCITES: ICD-10-CM

## 2017-05-16 DIAGNOSIS — R74.8 ABNORMAL LEVELS OF OTHER SERUM ENZYMES: ICD-10-CM

## 2017-05-16 DIAGNOSIS — K21.9 GASTRO-ESOPHAGEAL REFLUX DISEASE WITHOUT ESOPHAGITIS: ICD-10-CM

## 2017-05-16 DIAGNOSIS — K56.69 OTHER INTESTINAL OBSTRUCTION: ICD-10-CM

## 2017-05-16 DIAGNOSIS — F41.8 OTHER SPECIFIED ANXIETY DISORDERS: ICD-10-CM

## 2017-05-16 DIAGNOSIS — E87.6 HYPOKALEMIA: ICD-10-CM

## 2017-05-17 ENCOUNTER — EMERGENCY (EMERGENCY)
Facility: HOSPITAL | Age: 62
LOS: 1 days | Discharge: HOME | End: 2017-05-17
Attending: EMERGENCY MEDICINE
Payer: COMMERCIAL

## 2017-05-17 VITALS
DIASTOLIC BLOOD PRESSURE: 72 MMHG | RESPIRATION RATE: 20 BRPM | SYSTOLIC BLOOD PRESSURE: 128 MMHG | TEMPERATURE: 98 F | HEART RATE: 80 BPM | OXYGEN SATURATION: 100 %

## 2017-05-17 VITALS
HEART RATE: 96 BPM | OXYGEN SATURATION: 98 % | RESPIRATION RATE: 20 BRPM | HEIGHT: 60 IN | DIASTOLIC BLOOD PRESSURE: 100 MMHG | TEMPERATURE: 98 F | SYSTOLIC BLOOD PRESSURE: 133 MMHG | WEIGHT: 154.98 LBS

## 2017-05-17 DIAGNOSIS — E87.6 HYPOKALEMIA: ICD-10-CM

## 2017-05-17 DIAGNOSIS — E78.5 HYPERLIPIDEMIA, UNSPECIFIED: ICD-10-CM

## 2017-05-17 DIAGNOSIS — I10 ESSENTIAL (PRIMARY) HYPERTENSION: ICD-10-CM

## 2017-05-17 LAB
ALBUMIN SERPL ELPH-MCNC: 3.3 G/DL — LOW (ref 3.5–5)
ALP SERPL-CCNC: 100 U/L — SIGNIFICANT CHANGE UP (ref 40–120)
ALT FLD-CCNC: 114 U/L DA — HIGH (ref 10–60)
ANION GAP SERPL CALC-SCNC: 9 MMOL/L — SIGNIFICANT CHANGE UP (ref 5–17)
APPEARANCE UR: CLEAR — SIGNIFICANT CHANGE UP
AST SERPL-CCNC: 215 U/L — HIGH (ref 10–40)
BASOPHILS # BLD AUTO: 0.1 K/UL — SIGNIFICANT CHANGE UP (ref 0–0.2)
BASOPHILS NFR BLD AUTO: 0.7 % — SIGNIFICANT CHANGE UP (ref 0–2)
BILIRUB SERPL-MCNC: 0.8 MG/DL — SIGNIFICANT CHANGE UP (ref 0.2–1.2)
BILIRUB UR-MCNC: NEGATIVE — SIGNIFICANT CHANGE UP
BUN SERPL-MCNC: 15 MG/DL — SIGNIFICANT CHANGE UP (ref 7–18)
CALCIUM SERPL-MCNC: 8.5 MG/DL — SIGNIFICANT CHANGE UP (ref 8.4–10.5)
CHLORIDE SERPL-SCNC: 111 MMOL/L — HIGH (ref 96–108)
CO2 SERPL-SCNC: 26 MMOL/L — SIGNIFICANT CHANGE UP (ref 22–31)
COLOR SPEC: YELLOW — SIGNIFICANT CHANGE UP
CREAT SERPL-MCNC: 0.89 MG/DL — SIGNIFICANT CHANGE UP (ref 0.5–1.3)
DIFF PNL FLD: ABNORMAL
EOSINOPHIL # BLD AUTO: 0.3 K/UL — SIGNIFICANT CHANGE UP (ref 0–0.5)
EOSINOPHIL NFR BLD AUTO: 2.1 % — SIGNIFICANT CHANGE UP (ref 0–6)
GLUCOSE SERPL-MCNC: 115 MG/DL — HIGH (ref 70–99)
GLUCOSE UR QL: NEGATIVE — SIGNIFICANT CHANGE UP
HCT VFR BLD CALC: 36.1 % — SIGNIFICANT CHANGE UP (ref 34.5–45)
HGB BLD-MCNC: 12.1 G/DL — SIGNIFICANT CHANGE UP (ref 11.5–15.5)
KETONES UR-MCNC: NEGATIVE — SIGNIFICANT CHANGE UP
LACTATE SERPL-SCNC: 0.6 MMOL/L — LOW (ref 0.7–2)
LEUKOCYTE ESTERASE UR-ACNC: ABNORMAL
LYMPHOCYTES # BLD AUTO: 1.4 K/UL — SIGNIFICANT CHANGE UP (ref 1–3.3)
LYMPHOCYTES # BLD AUTO: 9.3 % — LOW (ref 13–44)
MCHC RBC-ENTMCNC: 29.3 PG — SIGNIFICANT CHANGE UP (ref 27–34)
MCHC RBC-ENTMCNC: 33.5 GM/DL — SIGNIFICANT CHANGE UP (ref 32–36)
MCV RBC AUTO: 87.3 FL — SIGNIFICANT CHANGE UP (ref 80–100)
MONOCYTES # BLD AUTO: 1 K/UL — HIGH (ref 0–0.9)
MONOCYTES NFR BLD AUTO: 6.5 % — SIGNIFICANT CHANGE UP (ref 2–14)
NEUTROPHILS # BLD AUTO: 12.4 K/UL — HIGH (ref 1.8–7.4)
NEUTROPHILS NFR BLD AUTO: 81.4 % — HIGH (ref 43–77)
NITRITE UR-MCNC: NEGATIVE — SIGNIFICANT CHANGE UP
PH UR: 5 — SIGNIFICANT CHANGE UP (ref 5–8)
PLATELET # BLD AUTO: 192 K/UL — SIGNIFICANT CHANGE UP (ref 150–400)
POTASSIUM SERPL-MCNC: 3.1 MMOL/L — LOW (ref 3.5–5.3)
POTASSIUM SERPL-SCNC: 3.1 MMOL/L — LOW (ref 3.5–5.3)
PROT SERPL-MCNC: 7 G/DL — SIGNIFICANT CHANGE UP (ref 6–8.3)
PROT UR-MCNC: 100
RBC # BLD: 4.14 M/UL — SIGNIFICANT CHANGE UP (ref 3.8–5.2)
RBC # FLD: 12.1 % — SIGNIFICANT CHANGE UP (ref 10.3–14.5)
SODIUM SERPL-SCNC: 146 MMOL/L — HIGH (ref 135–145)
SP GR SPEC: 1.02 — SIGNIFICANT CHANGE UP (ref 1.01–1.02)
UROBILINOGEN FLD QL: NEGATIVE — SIGNIFICANT CHANGE UP
WBC # BLD: 15.2 K/UL — HIGH (ref 3.8–10.5)
WBC # FLD AUTO: 15.2 K/UL — HIGH (ref 3.8–10.5)

## 2017-05-17 PROCEDURE — 74020: CPT | Mod: 26

## 2017-05-17 PROCEDURE — 80053 COMPREHEN METABOLIC PANEL: CPT

## 2017-05-17 PROCEDURE — 96372 THER/PROPH/DIAG INJ SC/IM: CPT | Mod: 59

## 2017-05-17 PROCEDURE — 83605 ASSAY OF LACTIC ACID: CPT

## 2017-05-17 PROCEDURE — 96374 THER/PROPH/DIAG INJ IV PUSH: CPT

## 2017-05-17 PROCEDURE — 99284 EMERGENCY DEPT VISIT MOD MDM: CPT | Mod: 25

## 2017-05-17 PROCEDURE — 81001 URINALYSIS AUTO W/SCOPE: CPT

## 2017-05-17 PROCEDURE — 74020: CPT

## 2017-05-17 PROCEDURE — 85027 COMPLETE CBC AUTOMATED: CPT

## 2017-05-17 RX ORDER — SUCRALFATE 1 G
1 TABLET ORAL ONCE
Qty: 0 | Refills: 0 | Status: COMPLETED | OUTPATIENT
Start: 2017-05-17 | End: 2017-05-17

## 2017-05-17 RX ORDER — POTASSIUM CHLORIDE 20 MEQ
40 PACKET (EA) ORAL ONCE
Qty: 0 | Refills: 0 | Status: COMPLETED | OUTPATIENT
Start: 2017-05-17 | End: 2017-05-17

## 2017-05-17 RX ORDER — POTASSIUM CHLORIDE 20 MEQ
10 PACKET (EA) ORAL ONCE
Qty: 0 | Refills: 0 | Status: COMPLETED | OUTPATIENT
Start: 2017-05-17 | End: 2017-05-17

## 2017-05-17 RX ORDER — SODIUM CHLORIDE 9 MG/ML
1000 INJECTION INTRAMUSCULAR; INTRAVENOUS; SUBCUTANEOUS
Qty: 0 | Refills: 0 | Status: DISCONTINUED | OUTPATIENT
Start: 2017-05-17 | End: 2017-05-21

## 2017-05-17 RX ADMIN — Medication 100 MILLIEQUIVALENT(S): at 04:28

## 2017-05-17 RX ADMIN — SODIUM CHLORIDE 250 MILLILITER(S): 9 INJECTION INTRAMUSCULAR; INTRAVENOUS; SUBCUTANEOUS at 04:28

## 2017-05-17 RX ADMIN — Medication 40 MILLIEQUIVALENT(S): at 04:28

## 2017-05-17 RX ADMIN — Medication 1 GRAM(S): at 03:06

## 2017-05-17 RX ADMIN — Medication 20 MILLIGRAM(S): at 03:57

## 2017-05-17 NOTE — ED PROVIDER NOTE - NS ED MD SCRIBE ATTENDING SCRIBE SECTIONS
DISPOSITION/PAST MEDICAL/SURGICAL/SOCIAL HISTORY/PHYSICAL EXAM/HISTORY OF PRESENT ILLNESS/REVIEW OF SYSTEMS/HIV/VITAL SIGNS( [place])

## 2017-05-17 NOTE — ED ADULT TRIAGE NOTE - CHIEF COMPLAINT QUOTE
abdominal pain tonight,was discharge from hosp last tuesday for bowel obstruction,,no surgery done, abdominal pain tonight,was discharged from hosp 2 days ago for bowel obstruction,,no surgery done,

## 2017-05-17 NOTE — ED PROVIDER NOTE - OBJECTIVE STATEMENT
61 y/o F pt w/ PMHx of SBO, Gastritis, UTI, Heart murmur, HLD, HTN, and Anxiety presents to ED c/o abd pain today. Pt states that she was D/C from the hospital 2 days ago for small bowel obstruction (no surgery done); pt is supposed to f/u w/ GI but presents to ED for recurrence of abd pain. Pt denies vomiting, diarrhea, fever, or any other complaints. Pt reports that she took Gas-X to no relief. NKDA.

## 2017-05-17 NOTE — ED ADULT NURSE NOTE - OBJECTIVE STATEMENT
61 y/o female with c/o abdominal pain pt states she was seen in the ED for the same problem in the ED 2 days ago . feeling nauseaus but denies any vomiting

## 2017-05-17 NOTE — ED PROVIDER NOTE - MEDICAL DECISION MAKING DETAILS
61 y/o F pt w/ abd pain and history of SBO. Will do labs, check electrolytes, straight and upright to r.o obstructive pattern, reassess. 61 y/o F pt w/ abd pain and history of SBO. Will do labs, check electrolytes, straight and upright to r.o obstructive pattern, reassess. Xray wnl. pt given potassium in ED. On re-evaluation, pt has resolution of pain. pt is tolerating po, well appearing in ED. Vitals stable

## 2018-01-01 NOTE — DISCHARGE NOTE ADULT - MEDICATION SUMMARY - MEDICATIONS TO TAKE
I will START or STAY ON the medications listed below when I get home from the hospital:    gabapentin 300 mg oral capsule  --  by mouth 2 times a day  -- Indication: For As previously prescribed    sertraline 25 mg oral tablet  -- 1 tab(s) by mouth once a day  -- Indication: For As previously prescribed    Crestor 5 mg oral tablet  -- 1 tab(s) by mouth once a day (at bedtime)  -- Indication: For As previously prescribed    hydrochlorothiazide-lisinopril 12.5 mg-20 mg oral tablet  -- 1 tab(s) by mouth once a day  -- Indication: For As previously prescribed    metoprolol 50 mg oral tablet, extended release  -- 1 tab(s) by mouth once a day  -- Indication: For As previously prescribed    Folplex Vitamin B Complex with Folic Acid oral tablet  -- 1 tab(s) by mouth once a day  -- Indication: For As previously prescribed
Sharita Chan  (PCA)  2018 00:24:52

## 2018-01-25 ENCOUNTER — EMERGENCY (EMERGENCY)
Facility: HOSPITAL | Age: 63
LOS: 1 days | Discharge: HOME | End: 2018-01-25
Attending: EMERGENCY MEDICINE
Payer: COMMERCIAL

## 2018-01-25 VITALS
DIASTOLIC BLOOD PRESSURE: 63 MMHG | WEIGHT: 160.06 LBS | HEART RATE: 100 BPM | SYSTOLIC BLOOD PRESSURE: 100 MMHG | TEMPERATURE: 100 F | OXYGEN SATURATION: 98 % | RESPIRATION RATE: 18 BRPM | HEIGHT: 60 IN

## 2018-01-25 PROCEDURE — 99284 EMERGENCY DEPT VISIT MOD MDM: CPT

## 2018-01-25 RX ORDER — KETOROLAC TROMETHAMINE 30 MG/ML
15 SYRINGE (ML) INJECTION ONCE
Qty: 0 | Refills: 0 | Status: DISCONTINUED | OUTPATIENT
Start: 2018-01-25 | End: 2018-01-25

## 2018-01-25 RX ORDER — SODIUM CHLORIDE 9 MG/ML
1000 INJECTION INTRAMUSCULAR; INTRAVENOUS; SUBCUTANEOUS ONCE
Qty: 0 | Refills: 0 | Status: COMPLETED | OUTPATIENT
Start: 2018-01-25 | End: 2018-01-25

## 2018-01-25 NOTE — ED PROVIDER NOTE - OBJECTIVE STATEMENT
62 yr old female with hx of HTn, HLD and SBO presents to ed c/o abd cramping throughout abd since 5am.  had normal Bm today. no fever, no chills, no visual changes, no headache, no numbness or tingling, no sob, no cough, no cp, no palpitations, no leg swelling, no syncope, no n/v/d, no dysuria, no rashes.

## 2018-01-25 NOTE — ED PROVIDER NOTE - MEDICAL DECISION MAKING DETAILS
62 yr old female with hx of HTn, HLD and SBO presents to ed c/o abd cramping throughout abd since 5am.  had normal Bm today. no fever, no chills, no visual changes, no headache, no numbness or tingling, no sob, no cough, no cp, no palpitations, no leg swelling, no syncope, no n/v/d, no dysuria, no rashes.    pt with abd pain likely abd gas vs acute gastro.  labs, fluids, meds, re-assess

## 2018-01-25 NOTE — ED PROVIDER NOTE - PROGRESS NOTE DETAILS
Reyes: wbc 12.  pt repeat abd exam soft, nt, nd, tolerating po.  hemodynamically stable.    Dx abd pain.  f/u with pcp.  return precautions.  re-assess patient with repeat abd exam which showed no worsening signs, remains soft without no localizing tenderness to RLQ.  Patient had no repeat emesis, tolerating fluids, no sign of acute abd (S/NT/ND, BS, no rebound or guarding).  At this time, current clinical picture less likely to be appendicitis or underlying surgical pathology. Discussed with pt of possible sign and symptoms of appendicitis and pt understands the sign and symptoms of appendicitis and knows to return for re-eval at nearest ER. Reyes: pt repeat temp shows temp 101.1 with hr 101.  pt comfortable.    Will s/o to Dr maza - repeat labs after fluids.  abdominal pain nos Reyes: pt repeat temp shows temp 101.1 with hr 101.  pt comfortable.    Will s/o to Dr maza - repeat vs after fluids.  abdominal pain nos

## 2018-01-26 VITALS — SYSTOLIC BLOOD PRESSURE: 96 MMHG | DIASTOLIC BLOOD PRESSURE: 61 MMHG

## 2018-01-26 LAB
ALBUMIN SERPL ELPH-MCNC: 4 G/DL — SIGNIFICANT CHANGE UP (ref 3.5–5)
ALP SERPL-CCNC: 62 U/L — SIGNIFICANT CHANGE UP (ref 40–120)
ALT FLD-CCNC: 21 U/L DA — SIGNIFICANT CHANGE UP (ref 10–60)
ANION GAP SERPL CALC-SCNC: 8 MMOL/L — SIGNIFICANT CHANGE UP (ref 5–17)
APTT BLD: 28.9 SEC — SIGNIFICANT CHANGE UP (ref 27.5–37.4)
AST SERPL-CCNC: 16 U/L — SIGNIFICANT CHANGE UP (ref 10–40)
BASOPHILS # BLD AUTO: 0 K/UL — SIGNIFICANT CHANGE UP (ref 0–0.2)
BASOPHILS NFR BLD AUTO: 0.4 % — SIGNIFICANT CHANGE UP (ref 0–2)
BILIRUB SERPL-MCNC: 0.9 MG/DL — SIGNIFICANT CHANGE UP (ref 0.2–1.2)
BUN SERPL-MCNC: 17 MG/DL — SIGNIFICANT CHANGE UP (ref 7–18)
CALCIUM SERPL-MCNC: 9.3 MG/DL — SIGNIFICANT CHANGE UP (ref 8.4–10.5)
CHLORIDE SERPL-SCNC: 102 MMOL/L — SIGNIFICANT CHANGE UP (ref 96–108)
CO2 SERPL-SCNC: 29 MMOL/L — SIGNIFICANT CHANGE UP (ref 22–31)
CREAT SERPL-MCNC: 1.19 MG/DL — SIGNIFICANT CHANGE UP (ref 0.5–1.3)
EOSINOPHIL # BLD AUTO: 0.1 K/UL — SIGNIFICANT CHANGE UP (ref 0–0.5)
EOSINOPHIL NFR BLD AUTO: 0.6 % — SIGNIFICANT CHANGE UP (ref 0–6)
GLUCOSE SERPL-MCNC: 151 MG/DL — HIGH (ref 70–99)
HCT VFR BLD CALC: 43.4 % — SIGNIFICANT CHANGE UP (ref 34.5–45)
HGB BLD-MCNC: 14.4 G/DL — SIGNIFICANT CHANGE UP (ref 11.5–15.5)
INR BLD: 1.04 RATIO — SIGNIFICANT CHANGE UP (ref 0.88–1.16)
LIDOCAIN IGE QN: 409 U/L — HIGH (ref 73–393)
LYMPHOCYTES # BLD AUTO: 0.4 K/UL — LOW (ref 1–3.3)
LYMPHOCYTES # BLD AUTO: 3 % — LOW (ref 13–44)
MCHC RBC-ENTMCNC: 30.1 PG — SIGNIFICANT CHANGE UP (ref 27–34)
MCHC RBC-ENTMCNC: 33.3 GM/DL — SIGNIFICANT CHANGE UP (ref 32–36)
MCV RBC AUTO: 90.2 FL — SIGNIFICANT CHANGE UP (ref 80–100)
MONOCYTES # BLD AUTO: 1 K/UL — HIGH (ref 0–0.9)
MONOCYTES NFR BLD AUTO: 7.7 % — SIGNIFICANT CHANGE UP (ref 2–14)
NEUTROPHILS # BLD AUTO: 11 K/UL — HIGH (ref 1.8–7.4)
NEUTROPHILS NFR BLD AUTO: 88.3 % — HIGH (ref 43–77)
PLATELET # BLD AUTO: 164 K/UL — SIGNIFICANT CHANGE UP (ref 150–400)
POTASSIUM SERPL-MCNC: 3.6 MMOL/L — SIGNIFICANT CHANGE UP (ref 3.5–5.3)
POTASSIUM SERPL-SCNC: 3.6 MMOL/L — SIGNIFICANT CHANGE UP (ref 3.5–5.3)
PROT SERPL-MCNC: 7.9 G/DL — SIGNIFICANT CHANGE UP (ref 6–8.3)
PROTHROM AB SERPL-ACNC: 11.3 SEC — SIGNIFICANT CHANGE UP (ref 9.8–12.7)
RBC # BLD: 4.81 M/UL — SIGNIFICANT CHANGE UP (ref 3.8–5.2)
RBC # FLD: 12.2 % — SIGNIFICANT CHANGE UP (ref 10.3–14.5)
SODIUM SERPL-SCNC: 139 MMOL/L — SIGNIFICANT CHANGE UP (ref 135–145)
WBC # BLD: 12.4 K/UL — HIGH (ref 3.8–10.5)
WBC # FLD AUTO: 12.4 K/UL — HIGH (ref 3.8–10.5)

## 2018-01-26 PROCEDURE — 86850 RBC ANTIBODY SCREEN: CPT

## 2018-01-26 PROCEDURE — 85730 THROMBOPLASTIN TIME PARTIAL: CPT

## 2018-01-26 PROCEDURE — 86900 BLOOD TYPING SEROLOGIC ABO: CPT

## 2018-01-26 PROCEDURE — 99284 EMERGENCY DEPT VISIT MOD MDM: CPT | Mod: 25

## 2018-01-26 PROCEDURE — 96374 THER/PROPH/DIAG INJ IV PUSH: CPT

## 2018-01-26 PROCEDURE — 86901 BLOOD TYPING SEROLOGIC RH(D): CPT

## 2018-01-26 PROCEDURE — 85027 COMPLETE CBC AUTOMATED: CPT

## 2018-01-26 PROCEDURE — 80053 COMPREHEN METABOLIC PANEL: CPT

## 2018-01-26 PROCEDURE — 85610 PROTHROMBIN TIME: CPT

## 2018-01-26 PROCEDURE — 83690 ASSAY OF LIPASE: CPT

## 2018-01-26 RX ORDER — ACETAMINOPHEN 500 MG
975 TABLET ORAL ONCE
Qty: 0 | Refills: 0 | Status: COMPLETED | OUTPATIENT
Start: 2018-01-26 | End: 2018-01-26

## 2018-01-26 RX ADMIN — Medication 15 MILLIGRAM(S): at 03:30

## 2018-01-26 RX ADMIN — Medication 15 MILLIGRAM(S): at 01:23

## 2018-01-26 RX ADMIN — Medication 975 MILLIGRAM(S): at 03:29

## 2018-01-26 RX ADMIN — SODIUM CHLORIDE 1000 MILLILITER(S): 9 INJECTION INTRAMUSCULAR; INTRAVENOUS; SUBCUTANEOUS at 01:24

## 2018-01-26 RX ADMIN — Medication 30 MILLILITER(S): at 01:23

## 2018-02-15 ENCOUNTER — EMERGENCY (EMERGENCY)
Facility: HOSPITAL | Age: 63
LOS: 1 days | Discharge: HOME | End: 2018-02-15
Attending: EMERGENCY MEDICINE
Payer: COMMERCIAL

## 2018-02-15 VITALS
SYSTOLIC BLOOD PRESSURE: 115 MMHG | OXYGEN SATURATION: 100 % | RESPIRATION RATE: 15 BRPM | DIASTOLIC BLOOD PRESSURE: 70 MMHG | TEMPERATURE: 99 F | HEART RATE: 86 BPM

## 2018-02-15 VITALS
RESPIRATION RATE: 18 BRPM | SYSTOLIC BLOOD PRESSURE: 111 MMHG | DIASTOLIC BLOOD PRESSURE: 75 MMHG | OXYGEN SATURATION: 100 % | HEART RATE: 94 BPM | TEMPERATURE: 99 F

## 2018-02-15 LAB
ALBUMIN SERPL ELPH-MCNC: 3.8 G/DL — SIGNIFICANT CHANGE UP (ref 3.5–5)
ALP SERPL-CCNC: 43 U/L — SIGNIFICANT CHANGE UP (ref 40–120)
ALT FLD-CCNC: 22 U/L DA — SIGNIFICANT CHANGE UP (ref 10–60)
ANION GAP SERPL CALC-SCNC: 8 MMOL/L — SIGNIFICANT CHANGE UP (ref 5–17)
AST SERPL-CCNC: 14 U/L — SIGNIFICANT CHANGE UP (ref 10–40)
BASOPHILS # BLD AUTO: 0.1 K/UL — SIGNIFICANT CHANGE UP (ref 0–0.2)
BASOPHILS NFR BLD AUTO: 0.6 % — SIGNIFICANT CHANGE UP (ref 0–2)
BILIRUB SERPL-MCNC: 0.7 MG/DL — SIGNIFICANT CHANGE UP (ref 0.2–1.2)
BUN SERPL-MCNC: 22 MG/DL — HIGH (ref 7–18)
CALCIUM SERPL-MCNC: 8.6 MG/DL — SIGNIFICANT CHANGE UP (ref 8.4–10.5)
CHLORIDE SERPL-SCNC: 108 MMOL/L — SIGNIFICANT CHANGE UP (ref 96–108)
CO2 SERPL-SCNC: 26 MMOL/L — SIGNIFICANT CHANGE UP (ref 22–31)
CREAT SERPL-MCNC: 0.91 MG/DL — SIGNIFICANT CHANGE UP (ref 0.5–1.3)
EOSINOPHIL # BLD AUTO: 0.2 K/UL — SIGNIFICANT CHANGE UP (ref 0–0.5)
EOSINOPHIL NFR BLD AUTO: 1.3 % — SIGNIFICANT CHANGE UP (ref 0–6)
GLUCOSE SERPL-MCNC: 131 MG/DL — HIGH (ref 70–99)
HCT VFR BLD CALC: 43.7 % — SIGNIFICANT CHANGE UP (ref 34.5–45)
HGB BLD-MCNC: 14.2 G/DL — SIGNIFICANT CHANGE UP (ref 11.5–15.5)
LACTATE SERPL-SCNC: 1 MMOL/L — SIGNIFICANT CHANGE UP (ref 0.7–2)
LYMPHOCYTES # BLD AUTO: 0.5 K/UL — LOW (ref 1–3.3)
LYMPHOCYTES # BLD AUTO: 4 % — LOW (ref 13–44)
MCHC RBC-ENTMCNC: 29 PG — SIGNIFICANT CHANGE UP (ref 27–34)
MCHC RBC-ENTMCNC: 32.4 GM/DL — SIGNIFICANT CHANGE UP (ref 32–36)
MCV RBC AUTO: 89.5 FL — SIGNIFICANT CHANGE UP (ref 80–100)
MONOCYTES # BLD AUTO: 0.9 K/UL — SIGNIFICANT CHANGE UP (ref 0–0.9)
MONOCYTES NFR BLD AUTO: 6.8 % — SIGNIFICANT CHANGE UP (ref 2–14)
NEUTROPHILS # BLD AUTO: 11.6 K/UL — HIGH (ref 1.8–7.4)
NEUTROPHILS NFR BLD AUTO: 87.3 % — HIGH (ref 43–77)
PLATELET # BLD AUTO: 199 K/UL — SIGNIFICANT CHANGE UP (ref 150–400)
POTASSIUM SERPL-MCNC: 3.7 MMOL/L — SIGNIFICANT CHANGE UP (ref 3.5–5.3)
POTASSIUM SERPL-SCNC: 3.7 MMOL/L — SIGNIFICANT CHANGE UP (ref 3.5–5.3)
PROT SERPL-MCNC: 7.4 G/DL — SIGNIFICANT CHANGE UP (ref 6–8.3)
RBC # BLD: 4.89 M/UL — SIGNIFICANT CHANGE UP (ref 3.8–5.2)
RBC # FLD: 12.4 % — SIGNIFICANT CHANGE UP (ref 10.3–14.5)
SODIUM SERPL-SCNC: 142 MMOL/L — SIGNIFICANT CHANGE UP (ref 135–145)
WBC # BLD: 13.2 K/UL — HIGH (ref 3.8–10.5)
WBC # FLD AUTO: 13.2 K/UL — HIGH (ref 3.8–10.5)

## 2018-02-15 PROCEDURE — 99283 EMERGENCY DEPT VISIT LOW MDM: CPT | Mod: 25

## 2018-02-15 PROCEDURE — 99284 EMERGENCY DEPT VISIT MOD MDM: CPT

## 2018-02-15 PROCEDURE — 85027 COMPLETE CBC AUTOMATED: CPT

## 2018-02-15 PROCEDURE — 80053 COMPREHEN METABOLIC PANEL: CPT

## 2018-02-15 PROCEDURE — 93005 ELECTROCARDIOGRAM TRACING: CPT

## 2018-02-15 PROCEDURE — 83605 ASSAY OF LACTIC ACID: CPT

## 2018-02-15 PROCEDURE — 82550 ASSAY OF CK (CPK): CPT

## 2018-02-15 PROCEDURE — 82553 CREATINE MB FRACTION: CPT

## 2018-02-15 PROCEDURE — 84484 ASSAY OF TROPONIN QUANT: CPT

## 2018-02-15 NOTE — ED ADULT NURSE NOTE - OBJECTIVE STATEMENT
Pt. is aojessica came to er via private car c/o abdominal pain and diarrhea labs were sent awaiting result from lab

## 2018-02-15 NOTE — ED PROVIDER NOTE - OBJECTIVE STATEMENT
I was at work and I had lower abd pain, crampy in nature, then had a large loose bm, asst with some diaphoresis.  no cp no sob no syncope  pt states she thought

## 2018-02-15 NOTE — ED PROVIDER NOTE - NOTES
dr valdez would like EKG and cardiac enzymes....will obtain    if okay, pt to follow up with dr. valdez this week

## 2018-10-08 ENCOUNTER — INPATIENT (INPATIENT)
Facility: HOSPITAL | Age: 63
LOS: 2 days | Discharge: HOME | DRG: 390 | End: 2018-10-11
Attending: SURGERY | Admitting: SURGERY
Payer: COMMERCIAL

## 2018-10-08 VITALS
HEART RATE: 81 BPM | WEIGHT: 160.06 LBS | DIASTOLIC BLOOD PRESSURE: 73 MMHG | OXYGEN SATURATION: 97 % | TEMPERATURE: 99 F | HEIGHT: 60 IN | RESPIRATION RATE: 17 BRPM | SYSTOLIC BLOOD PRESSURE: 113 MMHG

## 2018-10-08 LAB
ALBUMIN SERPL ELPH-MCNC: 3.9 G/DL — SIGNIFICANT CHANGE UP (ref 3.5–5)
ALP SERPL-CCNC: 49 U/L — SIGNIFICANT CHANGE UP (ref 40–120)
ALT FLD-CCNC: 21 U/L DA — SIGNIFICANT CHANGE UP (ref 10–60)
ANION GAP SERPL CALC-SCNC: 5 MMOL/L — SIGNIFICANT CHANGE UP (ref 5–17)
AST SERPL-CCNC: 23 U/L — SIGNIFICANT CHANGE UP (ref 10–40)
BASOPHILS # BLD AUTO: 0.1 K/UL — SIGNIFICANT CHANGE UP (ref 0–0.2)
BASOPHILS NFR BLD AUTO: 0.7 % — SIGNIFICANT CHANGE UP (ref 0–2)
BILIRUB SERPL-MCNC: 0.6 MG/DL — SIGNIFICANT CHANGE UP (ref 0.2–1.2)
BUN SERPL-MCNC: 19 MG/DL — HIGH (ref 7–18)
CALCIUM SERPL-MCNC: 9.1 MG/DL — SIGNIFICANT CHANGE UP (ref 8.4–10.5)
CHLORIDE SERPL-SCNC: 105 MMOL/L — SIGNIFICANT CHANGE UP (ref 96–108)
CO2 SERPL-SCNC: 31 MMOL/L — SIGNIFICANT CHANGE UP (ref 22–31)
CREAT SERPL-MCNC: 0.96 MG/DL — SIGNIFICANT CHANGE UP (ref 0.5–1.3)
EOSINOPHIL # BLD AUTO: 0.2 K/UL — SIGNIFICANT CHANGE UP (ref 0–0.5)
EOSINOPHIL NFR BLD AUTO: 1.6 % — SIGNIFICANT CHANGE UP (ref 0–6)
GLUCOSE SERPL-MCNC: 100 MG/DL — HIGH (ref 70–99)
HCT VFR BLD CALC: 43.5 % — SIGNIFICANT CHANGE UP (ref 34.5–45)
HGB BLD-MCNC: 14 G/DL — SIGNIFICANT CHANGE UP (ref 11.5–15.5)
LIDOCAIN IGE QN: 239 U/L — SIGNIFICANT CHANGE UP (ref 73–393)
LYMPHOCYTES # BLD AUTO: 1.3 K/UL — SIGNIFICANT CHANGE UP (ref 1–3.3)
LYMPHOCYTES # BLD AUTO: 9.2 % — LOW (ref 13–44)
MCHC RBC-ENTMCNC: 28.7 PG — SIGNIFICANT CHANGE UP (ref 27–34)
MCHC RBC-ENTMCNC: 32.2 GM/DL — SIGNIFICANT CHANGE UP (ref 32–36)
MCV RBC AUTO: 89.1 FL — SIGNIFICANT CHANGE UP (ref 80–100)
MONOCYTES # BLD AUTO: 0.6 K/UL — SIGNIFICANT CHANGE UP (ref 0–0.9)
MONOCYTES NFR BLD AUTO: 4.6 % — SIGNIFICANT CHANGE UP (ref 2–14)
NEUTROPHILS # BLD AUTO: 11.7 K/UL — HIGH (ref 1.8–7.4)
NEUTROPHILS NFR BLD AUTO: 84 % — HIGH (ref 43–77)
PLATELET # BLD AUTO: 244 K/UL — SIGNIFICANT CHANGE UP (ref 150–400)
POTASSIUM SERPL-MCNC: 4.1 MMOL/L — SIGNIFICANT CHANGE UP (ref 3.5–5.3)
POTASSIUM SERPL-SCNC: 4.1 MMOL/L — SIGNIFICANT CHANGE UP (ref 3.5–5.3)
PROT SERPL-MCNC: 8 G/DL — SIGNIFICANT CHANGE UP (ref 6–8.3)
RBC # BLD: 4.88 M/UL — SIGNIFICANT CHANGE UP (ref 3.8–5.2)
RBC # FLD: 12.2 % — SIGNIFICANT CHANGE UP (ref 10.3–14.5)
SODIUM SERPL-SCNC: 141 MMOL/L — SIGNIFICANT CHANGE UP (ref 135–145)
WBC # BLD: 13.9 K/UL — HIGH (ref 3.8–10.5)
WBC # FLD AUTO: 13.9 K/UL — HIGH (ref 3.8–10.5)

## 2018-10-08 RX ORDER — MORPHINE SULFATE 50 MG/1
2 CAPSULE, EXTENDED RELEASE ORAL ONCE
Qty: 0 | Refills: 0 | Status: DISCONTINUED | OUTPATIENT
Start: 2018-10-08 | End: 2018-10-08

## 2018-10-08 RX ORDER — ONDANSETRON 8 MG/1
4 TABLET, FILM COATED ORAL ONCE
Qty: 0 | Refills: 0 | Status: COMPLETED | OUTPATIENT
Start: 2018-10-08 | End: 2018-10-08

## 2018-10-08 RX ORDER — SODIUM CHLORIDE 9 MG/ML
1000 INJECTION INTRAMUSCULAR; INTRAVENOUS; SUBCUTANEOUS ONCE
Qty: 0 | Refills: 0 | Status: COMPLETED | OUTPATIENT
Start: 2018-10-08 | End: 2018-10-08

## 2018-10-08 RX ADMIN — MORPHINE SULFATE 2 MILLIGRAM(S): 50 CAPSULE, EXTENDED RELEASE ORAL at 22:20

## 2018-10-08 RX ADMIN — ONDANSETRON 4 MILLIGRAM(S): 8 TABLET, FILM COATED ORAL at 22:20

## 2018-10-08 RX ADMIN — SODIUM CHLORIDE 1000 MILLILITER(S): 9 INJECTION INTRAMUSCULAR; INTRAVENOUS; SUBCUTANEOUS at 19:31

## 2018-10-08 NOTE — ED STATDOCS - OBJECTIVE STATEMENT
Telemedicine assessment was conducted (using real time 2 way audio-video technology) by Mickey Dunlap located at [address] [place] [id number]  +++++++++++++++++++++++++++++++++++++++++++++++++++  History and Plan:   64 y/o F with an extensive PMHx including SBO, Gastrits, HTN, Hypercholesteremia, Anxiety and no PSHx presents to ED c/o abd pain that pt describes as cramping sensation x this morning. Pain is localized to mid abd area. Pt denies vomiting, diarrhea, abnormal BM or any other complaint. NKDA.    Plan: Blood Work, Urine, CT scan, Main Telemedicine assessment was conducted (using real time 2 way audio-video technology) by Mickey Dunlap located at [address] [place] [id number]  +++++++++++++++++++++++++++++++++++++++++++++++++++  History and Plan:   64 y/o F with an extensive PMHx including SBO, Gastrits, HTN, Hypercholesteremia, Anxiety and no PSHx presents to ED c/o abd pain that pt describes as cramping sensation x this morning. Pain is localized to mid abd area. Pt denies vomiting, diarrhea, abnormal BM or any other complaint. NKDA.  hx sbo    Plan: Blood Work, Urine, CT scan, Main

## 2018-10-08 NOTE — ED PROVIDER NOTE - MEDICAL DECISION MAKING DETAILS
62 y/o F pt presents with with abd pain. Pt with Hx of SBO, but is otherwise well appearing with a soft abd. Given previous Hx will obtain lab work, CT, IV fluids and reassess.

## 2018-10-08 NOTE — ED PROVIDER NOTE - OBJECTIVE STATEMENT
64 y/o F pt with a significant PMHx of anxiety, HTN, HLD, UTI, previous SBO and no significant PSHx presents to the ED c/o mid-epigastric pain x this morning. Pt states she is passing gas and had a normal BM. Pt notes they never figured out why she had SBO in the past. Pt denies fever, chills, nausea, vomiting or any other complaints. NKDA.

## 2018-10-09 DIAGNOSIS — K56.609 UNSPECIFIED INTESTINAL OBSTRUCTION, UNSPECIFIED AS TO PARTIAL VERSUS COMPLETE OBSTRUCTION: ICD-10-CM

## 2018-10-09 PROBLEM — R01.1 CARDIAC MURMUR, UNSPECIFIED: Chronic | Status: ACTIVE | Noted: 2017-05-11

## 2018-10-09 PROBLEM — K29.70 GASTRITIS, UNSPECIFIED, WITHOUT BLEEDING: Chronic | Status: ACTIVE | Noted: 2017-05-11

## 2018-10-09 PROBLEM — K56.69 OTHER INTESTINAL OBSTRUCTION: Chronic | Status: ACTIVE | Noted: 2017-05-11

## 2018-10-09 PROBLEM — N39.0 URINARY TRACT INFECTION, SITE NOT SPECIFIED: Chronic | Status: ACTIVE | Noted: 2017-05-11

## 2018-10-09 LAB
ANION GAP SERPL CALC-SCNC: 9 MMOL/L — SIGNIFICANT CHANGE UP (ref 5–17)
APPEARANCE UR: CLEAR — SIGNIFICANT CHANGE UP
BILIRUB UR-MCNC: NEGATIVE — SIGNIFICANT CHANGE UP
BUN SERPL-MCNC: 19 MG/DL — HIGH (ref 7–18)
CALCIUM SERPL-MCNC: 8.1 MG/DL — LOW (ref 8.4–10.5)
CHLORIDE SERPL-SCNC: 107 MMOL/L — SIGNIFICANT CHANGE UP (ref 96–108)
CO2 SERPL-SCNC: 27 MMOL/L — SIGNIFICANT CHANGE UP (ref 22–31)
COLOR SPEC: YELLOW — SIGNIFICANT CHANGE UP
CREAT SERPL-MCNC: 1.01 MG/DL — SIGNIFICANT CHANGE UP (ref 0.5–1.3)
DIFF PNL FLD: ABNORMAL
GLUCOSE SERPL-MCNC: 125 MG/DL — HIGH (ref 70–99)
GLUCOSE UR QL: NEGATIVE — SIGNIFICANT CHANGE UP
HCT VFR BLD CALC: 40.7 % — SIGNIFICANT CHANGE UP (ref 34.5–45)
HGB BLD-MCNC: 13.3 G/DL — SIGNIFICANT CHANGE UP (ref 11.5–15.5)
KETONES UR-MCNC: ABNORMAL
LACTATE SERPL-SCNC: 1.6 MMOL/L — SIGNIFICANT CHANGE UP (ref 0.7–2)
LEUKOCYTE ESTERASE UR-ACNC: ABNORMAL
MCHC RBC-ENTMCNC: 28.6 PG — SIGNIFICANT CHANGE UP (ref 27–34)
MCHC RBC-ENTMCNC: 32.6 GM/DL — SIGNIFICANT CHANGE UP (ref 32–36)
MCV RBC AUTO: 87.9 FL — SIGNIFICANT CHANGE UP (ref 80–100)
NITRITE UR-MCNC: NEGATIVE — SIGNIFICANT CHANGE UP
PH UR: 5 — SIGNIFICANT CHANGE UP (ref 5–8)
PLATELET # BLD AUTO: 171 K/UL — SIGNIFICANT CHANGE UP (ref 150–400)
POTASSIUM SERPL-MCNC: 3.8 MMOL/L — SIGNIFICANT CHANGE UP (ref 3.5–5.3)
POTASSIUM SERPL-SCNC: 3.8 MMOL/L — SIGNIFICANT CHANGE UP (ref 3.5–5.3)
PROT UR-MCNC: NEGATIVE — SIGNIFICANT CHANGE UP
RBC # BLD: 4.63 M/UL — SIGNIFICANT CHANGE UP (ref 3.8–5.2)
RBC # FLD: 12 % — SIGNIFICANT CHANGE UP (ref 10.3–14.5)
SODIUM SERPL-SCNC: 143 MMOL/L — SIGNIFICANT CHANGE UP (ref 135–145)
SP GR SPEC: 1.01 — SIGNIFICANT CHANGE UP (ref 1.01–1.02)
UROBILINOGEN FLD QL: NEGATIVE — SIGNIFICANT CHANGE UP
WBC # BLD: 5.6 K/UL — SIGNIFICANT CHANGE UP (ref 3.8–10.5)
WBC # FLD AUTO: 5.6 K/UL — SIGNIFICANT CHANGE UP (ref 3.8–10.5)

## 2018-10-09 PROCEDURE — 99222 1ST HOSP IP/OBS MODERATE 55: CPT

## 2018-10-09 PROCEDURE — 71045 X-RAY EXAM CHEST 1 VIEW: CPT | Mod: 26

## 2018-10-09 PROCEDURE — 74177 CT ABD & PELVIS W/CONTRAST: CPT | Mod: 26

## 2018-10-09 PROCEDURE — 99285 EMERGENCY DEPT VISIT HI MDM: CPT

## 2018-10-09 PROCEDURE — 74019 RADEX ABDOMEN 2 VIEWS: CPT | Mod: 26

## 2018-10-09 RX ORDER — MORPHINE SULFATE 50 MG/1
2 CAPSULE, EXTENDED RELEASE ORAL ONCE
Qty: 0 | Refills: 0 | Status: DISCONTINUED | OUTPATIENT
Start: 2018-10-09 | End: 2018-10-09

## 2018-10-09 RX ORDER — DEXTROSE MONOHYDRATE, SODIUM CHLORIDE, AND POTASSIUM CHLORIDE 50; .745; 4.5 G/1000ML; G/1000ML; G/1000ML
1000 INJECTION, SOLUTION INTRAVENOUS
Qty: 0 | Refills: 0 | Status: DISCONTINUED | OUTPATIENT
Start: 2018-10-09 | End: 2018-10-11

## 2018-10-09 RX ORDER — ONDANSETRON 8 MG/1
4 TABLET, FILM COATED ORAL EVERY 6 HOURS
Qty: 0 | Refills: 0 | Status: DISCONTINUED | OUTPATIENT
Start: 2018-10-09 | End: 2018-10-11

## 2018-10-09 RX ORDER — ACETAMINOPHEN 500 MG
1000 TABLET ORAL ONCE
Qty: 0 | Refills: 0 | Status: COMPLETED | OUTPATIENT
Start: 2018-10-09 | End: 2018-10-09

## 2018-10-09 RX ORDER — SODIUM CHLORIDE 9 MG/ML
1000 INJECTION INTRAMUSCULAR; INTRAVENOUS; SUBCUTANEOUS ONCE
Qty: 0 | Refills: 0 | Status: COMPLETED | OUTPATIENT
Start: 2018-10-09 | End: 2018-10-09

## 2018-10-09 RX ADMIN — Medication 1000 MILLIGRAM(S): at 21:10

## 2018-10-09 RX ADMIN — MORPHINE SULFATE 2 MILLIGRAM(S): 50 CAPSULE, EXTENDED RELEASE ORAL at 01:00

## 2018-10-09 RX ADMIN — MORPHINE SULFATE 2 MILLIGRAM(S): 50 CAPSULE, EXTENDED RELEASE ORAL at 04:40

## 2018-10-09 RX ADMIN — Medication 400 MILLIGRAM(S): at 20:55

## 2018-10-09 RX ADMIN — MORPHINE SULFATE 2 MILLIGRAM(S): 50 CAPSULE, EXTENDED RELEASE ORAL at 01:02

## 2018-10-09 RX ADMIN — DEXTROSE MONOHYDRATE, SODIUM CHLORIDE, AND POTASSIUM CHLORIDE 150 MILLILITER(S): 50; .745; 4.5 INJECTION, SOLUTION INTRAVENOUS at 06:25

## 2018-10-09 RX ADMIN — SODIUM CHLORIDE 1000 MILLILITER(S): 9 INJECTION INTRAMUSCULAR; INTRAVENOUS; SUBCUTANEOUS at 01:04

## 2018-10-09 RX ADMIN — SODIUM CHLORIDE 1000 MILLILITER(S): 9 INJECTION INTRAMUSCULAR; INTRAVENOUS; SUBCUTANEOUS at 04:40

## 2018-10-09 RX ADMIN — DEXTROSE MONOHYDRATE, SODIUM CHLORIDE, AND POTASSIUM CHLORIDE 150 MILLILITER(S): 50; .745; 4.5 INJECTION, SOLUTION INTRAVENOUS at 20:57

## 2018-10-09 RX ADMIN — SODIUM CHLORIDE 1000 MILLILITER(S): 9 INJECTION INTRAMUSCULAR; INTRAVENOUS; SUBCUTANEOUS at 03:02

## 2018-10-09 NOTE — H&P ADULT - HISTORY OF PRESENT ILLNESS
62 y/o F pt with a significant PMHx of anxiety, HTN, HLD, UTI, previous SBO and no significant PSHx presents to the ED c/o mid-epigastric pain x this morning. Pt states she is passing gas and had a normal BM. Pt notes they never figured out why she had SBO in the past. Pt denies fever, chills, nausea, vomiting or any other complaints. NKDA.

## 2018-10-09 NOTE — PROGRESS NOTE ADULT - ASSESSMENT
63F with SBO of uncertain etiology. no abdominal surgical history.     1) continue NPO/NGT  2) if improves, SB series to evaluate, then advance diet; if resolves non-operatively, need colonoscopy and then possible capsule endoscopy  3) if no improvement, patient will need laparotomy

## 2018-10-09 NOTE — ED ADULT NURSE NOTE - NSIMPLEMENTINTERV_GEN_ALL_ED
Implemented All Universal Safety Interventions:  [place] to call system. Call bell, personal items and telephone within reach. Instruct patient to call for assistance. Room bathroom lighting operational. Non-slip footwear when patient is off stretcher. Physically safe environment: no spills, clutter or unnecessary equipment. Stretcher in lowest position, wheels locked, appropriate side rails in place.

## 2018-10-09 NOTE — PROGRESS NOTE ADULT - SUBJECTIVE AND OBJECTIVE BOX
SUBJECTIVE    reports having had 2 prior episodes that resolved with NGT decompressions. no apparent workup done, never had colonoscopy    Nausea [ ] YES [ X] NO  Vomiting [ ] YES [X ] NO  Voiding normally [X ] YES [ ] NO  Flatus [ ] YES [X ] NO  BM [ ] YES [ X] NO       Diarrhea [ ] YES [X ] NO  Pain under control [X ] YES [ ] NO-she has no pain  NPO/NGT  Ambulated [ X] YES NO [ ]        VITALS    ICU Vital Signs Last 24 Hrs  T(C): 37 (09 Oct 2018 05:05), Max: 37.2 (08 Oct 2018 20:54)  T(F): 98.6 (09 Oct 2018 05:05), Max: 98.9 (08 Oct 2018 20:54)  HR: 78 (09 Oct 2018 05:05) (78 - 101)  BP: 101/63 (09 Oct 2018 05:05) (95/64 - 113/73)  BP(mean): --  ABP: --  ABP(mean): --  RR: 16 (09 Oct 2018 05:05) (16 - 20)  SpO2: 99% (09 Oct 2018 05:05) (97% - 100%)    I&O's Detail    08 Oct 2018 07:01  -  09 Oct 2018 07:00  --------------------------------------------------------  IN:  Total IN: 0 mL    OUT:    Nasoenteral Tube: 100 mL  Total OUT: 100 mL    Total NET: -100 mL            PHYSICAL EXAMINATION    Abdomen: obese, soft, NT, ND, no scars    I&O's Summary    08 Oct 2018 07:01  -  09 Oct 2018 07:00  --------------------------------------------------------  IN: 0 mL / OUT: 100 mL / NET: -100 mL        LABS                        14.0   13.9  )-----------( 244      ( 08 Oct 2018 19:32 )             43.5             10-08    141  |  105  |  19<H>  ----------------------------<  100<H>  4.1   |  31  |  0.96    Ca    9.1      08 Oct 2018 19:32    TPro  8.0  /  Alb  3.9  /  TBili  0.6  /  DBili  x   /  AST  23  /  ALT  21  /  AlkPhos  49  10-08    LIVER FUNCTIONS - ( 08 Oct 2018 19:32 )  Alb: 3.9 g/dL / Pro: 8.0 g/dL / ALK PHOS: 49 U/L / ALT: 21 U/L DA / AST: 23 U/L / GGT: x             MEDICATIONS:  MEDICATIONS  (STANDING):  dextrose 5% + sodium chloride 0.45% with potassium chloride 20 mEq/L 1000 milliLiter(s) (150 mL/Hr) IV Continuous <Continuous>    MEDICATIONS  (PRN):  ondansetron Injectable 4 milliGRAM(s) IV Push every 6 hours PRN Nausea

## 2018-10-09 NOTE — PROGRESS NOTE ADULT - PROBLEM SELECTOR PLAN 1
1. AXR  2. Monitor NGT output   3. Monitor for BM and flatus   4. Will eventually need Small bowel series  5. continue NPO  6. Continue IV fluids for hydration

## 2018-10-09 NOTE — H&P ADULT - NSHPLABSRESULTS_GEN_ALL_CORE
< from: CT Abdomen and Pelvis w/ Oral Cont and w/ IV Cont (10.09.18 @ 00:55) >      IMPRESSION:    Multiple dilated fluid-filled small bowel loops throughout the abdomen,   compatible with recurrence of small bowel obstruction. Transition point   is reidentified in the right lower quadrant at the level of distal ileal   folds which are thickened with associated mild interloop fluid and   mesenteric edema. Correlate with lactic acid to assess for potential   bowel ischemia though proximal mesenteric vessels appear patent.   Decompression and short term imaging follow-up is advised.    Redemonstrated cystic lesion measuring 2 x 1 cm in the pancreatic   neck/body, appears slightly enlarged. Consider nonemergent pancreatic   protocol MR provided no MR contraindications.    Uncomplicated cholelithiasis.    < end of copied text >

## 2018-10-09 NOTE — PROGRESS NOTE ADULT - SUBJECTIVE AND OBJECTIVE BOX
pt is admitted with recurrent SBO  Feels better  improved abd pain    Vital Signs Last 24 Hrs  T(C): 37 (09 Oct 2018 05:05), Max: 37.2 (08 Oct 2018 20:54)  T(F): 98.6 (09 Oct 2018 05:05), Max: 98.9 (08 Oct 2018 20:54)  HR: 78 (09 Oct 2018 05:05) (78 - 101)  BP: 101/63 (09 Oct 2018 05:05) (95/64 - 113/73)  BP(mean): --  RR: 16 (09 Oct 2018 05:05) (16 - 20)  SpO2: 99% (09 Oct 2018 05:05) (97% - 100%)                          14.0   13.9  )-----------( 244      ( 08 Oct 2018 19:32 )             43.5   10-08    141  |  105  |  19<H>  ----------------------------<  100<H>  4.1   |  31  |  0.96    Ca    9.1      08 Oct 2018 19:32    TPro  8.0  /  Alb  3.9  /  TBili  0.6  /  DBili  x   /  AST  23  /  ALT  21  /  AlkPhos  49  10-08    < from: CT Abdomen and Pelvis w/ Oral Cont and w/ IV Cont (10.09.18 @ 00:55) >  MPRESSION:    Multiple dilated fluid-filled small bowel loops throughout the abdomen,   compatible with recurrence of small bowel obstruction. Transition point   is reidentified in the right lower quadrant at the level of distal ileal   folds which are thickened with associated mild interloop fluid and   mesenteric edema. Correlate with lactic acid to assess for potential   bowel ischemia though proximal mesenteric vessels appear patent.   Decompression and short term imaging follow-up is advised.    Redemonstrated cystic lesion measuring 2 x 1 cm in the pancreatic   neck/body, appears slightly enlarged. Consider nonemergent pancreatic   protocol MR provided no MR contraindications.    Uncomplicated cholelithiasis.                JIM ZAMORA M.D., ATTENDING RADIOLOGIST  This document has been electronically signed. Oct  9 2018  1:12AM    pe        < end of copied text >  Imp/plan  SBO  admitted under surgical team    NPO  NGT to suctioning    GI consult with Dr Sosa when stable for GI w/u pt is admitted with recurrent SBO  Feels better  improved abd pain    PMH  Hypertension   Obesity   Systolic murmur      h/o mild MR on Echo    Anxiety    Vitamin D deficiency    Abnormal EKG    GERD    Knee pain  h/o Herpes Zoste  SBO (small bowel obstruction)   Comments: May 2017    Hyperlipemia  PUD (peptic ulcer disease) -     Dr Encarnacion  is GI  Constipation -   adverse reaction to cipro - diarrhea -    Vital Signs Last 24 Hrs  T(C): 37 (09 Oct 2018 05:05), Max: 37.2 (08 Oct 2018 20:54)  T(F): 98.6 (09 Oct 2018 05:05), Max: 98.9 (08 Oct 2018 20:54)  HR: 78 (09 Oct 2018 05:05) (78 - 101)  BP: 101/63 (09 Oct 2018 05:05) (95/64 - 113/73)  BP(mean): --  RR: 16 (09 Oct 2018 05:05) (16 - 20)  SpO2: 99% (09 Oct 2018 05:05) (97% - 100%)                          14.0   13.9  )-----------( 244      ( 08 Oct 2018 19:32 )             43.5   10-08    141  |  105  |  19<H>  ----------------------------<  100<H>  4.1   |  31  |  0.96    Ca    9.1      08 Oct 2018 19:32    TPro  8.0  /  Alb  3.9  /  TBili  0.6  /  DBili  x   /  AST  23  /  ALT  21  /  AlkPhos  49  10-08    < from: CT Abdomen and Pelvis w/ Oral Cont and w/ IV Cont (10.09.18 @ 00:55) >  MPRESSION:    Multiple dilated fluid-filled small bowel loops throughout the abdomen,   compatible with recurrence of small bowel obstruction. Transition point   is reidentified in the right lower quadrant at the level of distal ileal   folds which are thickened with associated mild interloop fluid and   mesenteric edema. Correlate with lactic acid to assess for potential   bowel ischemia though proximal mesenteric vessels appear patent.   Decompression and short term imaging follow-up is advised.    Redemonstrated cystic lesion measuring 2 x 1 cm in the pancreatic   neck/body, appears slightly enlarged. Consider nonemergent pancreatic   protocol MR provided no MR contraindications.    Uncomplicated cholelithiasis.                JIM ZAMORA M.D., ATTENDING RADIOLOGIST  This document has been electronically signed. Oct  9 2018  1:12AM    pe        < end of copied text >  Imp/plan  SBO  admitted under surgical team    NPO  NGT to suctioning    GI consult with Dr Encarnacion when stable for GI w/u pt is admitted with recurrent SBO  Feels better  improved abd pain    PMH  Hypertension   Obesity   Systolic murmur      h/o mild MR on Echo    Anxiety    Vitamin D deficiency    Abnormal EKG    GERD    Knee pain  h/o Herpes Zoste  SBO (small bowel obstruction)   Comments: May 2017    Hyperlipemia  PUD (peptic ulcer disease) -     Dr Encarnacion  is GI  Constipation -   adverse reaction to cipro - diarrhea -    FH  Father CKD DM HTN.    MOther HTN  CHF Dementia  Father prostated CA -    SH    No Drug Use  Marital status  Attributes: Single.  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    stress echo - neg  - [facility]  mild elevation of LFT ALT 83 09-13  echo - mild MR , mild LVH, normal EF - 09-13  EKG 09-13 T wave inversion anterolateral leads      Vital Signs Last 24 Hrs  T(C): 37 (09 Oct 2018 05:05), Max: 37.2 (08 Oct 2018 20:54)  T(F): 98.6 (09 Oct 2018 05:05), Max: 98.9 (08 Oct 2018 20:54)  HR: 78 (09 Oct 2018 05:05) (78 - 101)  BP: 101/63 (09 Oct 2018 05:05) (95/64 - 113/73)  BP(mean): --  RR: 16 (09 Oct 2018 05:05) (16 - 20)  SpO2: 99% (09 Oct 2018 05:05) (97% - 100%)                          14.0   13.9  )-----------( 244      ( 08 Oct 2018 19:32 )             43.5   10-08    141  |  105  |  19<H>  ----------------------------<  100<H>  4.1   |  31  |  0.96    Ca    9.1      08 Oct 2018 19:32    TPro  8.0  /  Alb  3.9  /  TBili  0.6  /  DBili  x   /  AST  23  /  ALT  21  /  AlkPhos  49  10-08    < from: CT Abdomen and Pelvis w/ Oral Cont and w/ IV Cont (10.09.18 @ 00:55) >  MPRESSION:    Multiple dilated fluid-filled small bowel loops throughout the abdomen,   compatible with recurrence of small bowel obstruction. Transition point   is reidentified in the right lower quadrant at the level of distal ileal   folds which are thickened with associated mild interloop fluid and   mesenteric edema. Correlate with lactic acid to assess for potential   bowel ischemia though proximal mesenteric vessels appear patent.   Decompression and short term imaging follow-up is advised.    Redemonstrated cystic lesion measuring 2 x 1 cm in the pancreatic   neck/body, appears slightly enlarged. Consider nonemergent pancreatic   protocol MR provided no MR contraindications.    Uncomplicated cholelithiasis.                JIM ZAMORA M.D., ATTENDING RADIOLOGIST  This document has been electronically signed. Oct  9 2018  1:12AM    pe        < end of copied text >  Imp/plan  SBO  admitted under surgical team    NPO  NGT to suctioning    GI consult with Dr Encarnacion when stable for GI w/u pt is admitted with recurrent SBO  Feels better  improved abd pain    PMH  Hypertension   Obesity   Systolic murmur      h/o mild MR on Echo    Anxiety    Vitamin D deficiency    Abnormal EKG    GERD    Knee pain  h/o Herpes Zoste  SBO (small bowel obstruction)   Comments: May 2017    Hyperlipemia  PUD (peptic ulcer disease) -     Dr Encarnacion  is GI  Constipation -   adverse reaction to cipro - diarrhea -    FH  Father CKD DM HTN.    MOther HTN  CHF Dementia  Father prostated CA -    SH    No Drug Use  Marital status  Attributes: Single.  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    stress echo - neg  - [facility]  mild elevation of LFT ALT 83 09-13  echo - mild MR , mild LVH, normal EF - 09-13  EKG 09-13 T wave inversion anterolateral leads    Medx   Crestor 5MG, 1 Tablet daily,   Lisinopril-Hydrochlorothiazide 20-12.5MG,  Sertraline HCl 25MG,   Toprol XL 25MG, 1 (one) Tablet Tablet daily,     Vital Signs Last 24 Hrs  T(C): 37 (09 Oct 2018 05:05), Max: 37.2 (08 Oct 2018 20:54)  T(F): 98.6 (09 Oct 2018 05:05), Max: 98.9 (08 Oct 2018 20:54)  HR: 78 (09 Oct 2018 05:05) (78 - 101)  BP: 101/63 (09 Oct 2018 05:05) (95/64 - 113/73)  BP(mean): --  RR: 16 (09 Oct 2018 05:05) (16 - 20)  SpO2: 99% (09 Oct 2018 05:05) (97% - 100%)                          14.0   13.9  )-----------( 244      ( 08 Oct 2018 19:32 )             43.5   10-08    141  |  105  |  19<H>  ----------------------------<  100<H>  4.1   |  31  |  0.96    Ca    9.1      08 Oct 2018 19:32    TPro  8.0  /  Alb  3.9  /  TBili  0.6  /  DBili  x   /  AST  23  /  ALT  21  /  AlkPhos  49  10-08    < from: CT Abdomen and Pelvis w/ Oral Cont and w/ IV Cont (10.09.18 @ 00:55) >          MPRESSION:    Multiple dilated fluid-filled small bowel loops throughout the abdomen,   compatible with recurrence of small bowel obstruction. Transition point   is reidentified in the right lower quadrant at the level of distal ileal   folds which are thickened with associated mild interloop fluid and   mesenteric edema. Correlate with lactic acid to assess for potential   bowel ischemia though proximal mesenteric vessels appear patent.   Decompression and short term imaging follow-up is advised.    Redemonstrated cystic lesion measuring 2 x 1 cm in the pancreatic   neck/body, appears slightly enlarged. Consider nonemergent pancreatic   protocol MR provided no MR contraindications.    Uncomplicated cholelithiasis.                JIM ZAMORA M.D., ATTENDING RADIOLOGIST  This document has been electronically signed. Oct  9 2018  1:12AM    pe        < end of copied text >  Imp/plan  SBO  admitted under surgical team    NPO  NGT to suctioning    GI consult with Dr Encarnacion when stable for GI w/u pt is admitted with recurrent SBO  Feels better  improved abd pain    PMH  Hypertension   Obesity   Systolic murmur      h/o mild MR on Echo    Anxiety    Vitamin D deficiency    Abnormal EKG    GERD    Knee pain  h/o Herpes Zoste  SBO (small bowel obstruction)   Comments: May 2017    Hyperlipemia  PUD (peptic ulcer disease) -     Dr Encarnacion  is GI  Constipation - pt refused GI w/u and c/s in the past  adverse reaction to cipro - diarrhea -    FH  Father CKD DM HTN.    MOther HTN  CHF Dementia  Father prostated CA -    SH    No Drug Use  Marital status  Attributes: Single.  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    stress echo - neg  - [facility]  mild elevation of LFT ALT 83 09-13  echo - mild MR , mild LVH, normal EF - 09-13  EKG 09-13 T wave inversion anterolateral leads    Medx   Crestor 5MG, 1 Tablet daily,   Lisinopril-Hydrochlorothiazide 20-12.5MG,  Sertraline HCl 25MG,   Toprol XL 25MG, 1 (one) Tablet Tablet daily,     Vital Signs Last 24 Hrs  T(C): 37 (09 Oct 2018 05:05), Max: 37.2 (08 Oct 2018 20:54)  T(F): 98.6 (09 Oct 2018 05:05), Max: 98.9 (08 Oct 2018 20:54)  HR: 78 (09 Oct 2018 05:05) (78 - 101)  BP: 101/63 (09 Oct 2018 05:05) (95/64 - 113/73)  BP(mean): --  RR: 16 (09 Oct 2018 05:05) (16 - 20)  SpO2: 99% (09 Oct 2018 05:05) (97% - 100%)                          14.0   13.9  )-----------( 244      ( 08 Oct 2018 19:32 )             43.5   10-08    141  |  105  |  19<H>  ----------------------------<  100<H>  4.1   |  31  |  0.96    Ca    9.1      08 Oct 2018 19:32    TPro  8.0  /  Alb  3.9  /  TBili  0.6  /  DBili  x   /  AST  23  /  ALT  21  /  AlkPhos  49  10-08    < from: CT Abdomen and Pelvis w/ Oral Cont and w/ IV Cont (10.09.18 @ 00:55) >          MPRESSION:    Multiple dilated fluid-filled small bowel loops throughout the abdomen,   compatible with recurrence of small bowel obstruction. Transition point   is reidentified in the right lower quadrant at the level of distal ileal   folds which are thickened with associated mild interloop fluid and   mesenteric edema. Correlate with lactic acid to assess for potential   bowel ischemia though proximal mesenteric vessels appear patent.   Decompression and short term imaging follow-up is advised.    Redemonstrated cystic lesion measuring 2 x 1 cm in the pancreatic   neck/body, appears slightly enlarged. Consider nonemergent pancreatic   protocol MR provided no MR contraindications.    Uncomplicated cholelithiasis.                JIM ZAMORA M.D., ATTENDING RADIOLOGIST  This document has been electronically signed. Oct  9 2018  1:12AM    pe        < end of copied text >  Imp/plan  SBO  admitted under surgical team    NPO  NGT to suctioning    GI consult with Dr Encarnacion when stable for GI w/u pt is admitted with recurrent SBO  Feels better  improved abd pain    PMH  Hypertension   Obesity   Systolic murmur      h/o mild MR on Echo    Anxiety    Vitamin D deficiency    Abnormal EKG    GERD    Knee pain  h/o Herpes Zoste  SBO (small bowel obstruction)   Comments: May 2017    Hyperlipemia  PUD (peptic ulcer disease) -     Dr Encarnacion  is GI  Constipation - pt refused GI w/u and c/s in the past  adverse reaction to cipro - diarrhea -    FH  Father CKD DM HTN.    MOther HTN  CHF Dementia  Father prostated CA -    SH  takes care of 2 elderly and sick parents who are at present  in NH for rehab    No Drug Use  Marital status  Attributes: Single.  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    stress echo - neg  - [facility]  mild elevation of LFT ALT 83 09-13  echo - mild MR , mild LVH, normal EF - 09-13  EKG 09-13 T wave inversion anterolateral leads    Medx   Crestor 5MG, 1 Tablet daily,   Lisinopril-Hydrochlorothiazide 20-12.5MG,  Sertraline HCl 25MG,   Toprol XL 25MG, 1 (one) Tablet Tablet daily,     Vital Signs Last 24 Hrs  T(C): 37 (09 Oct 2018 05:05), Max: 37.2 (08 Oct 2018 20:54)  T(F): 98.6 (09 Oct 2018 05:05), Max: 98.9 (08 Oct 2018 20:54)  HR: 78 (09 Oct 2018 05:05) (78 - 101)  BP: 101/63 (09 Oct 2018 05:05) (95/64 - 113/73)  BP(mean): --  RR: 16 (09 Oct 2018 05:05) (16 - 20)  SpO2: 99% (09 Oct 2018 05:05) (97% - 100%)                          14.0   13.9  )-----------( 244      ( 08 Oct 2018 19:32 )             43.5   10-08    141  |  105  |  19<H>  ----------------------------<  100<H>  4.1   |  31  |  0.96    Ca    9.1      08 Oct 2018 19:32    TPro  8.0  /  Alb  3.9  /  TBili  0.6  /  DBili  x   /  AST  23  /  ALT  21  /  AlkPhos  49  10-08    < from: CT Abdomen and Pelvis w/ Oral Cont and w/ IV Cont (10.09.18 @ 00:55) >          MPRESSION:    Multiple dilated fluid-filled small bowel loops throughout the abdomen,   compatible with recurrence of small bowel obstruction. Transition point   is reidentified in the right lower quadrant at the level of distal ileal   folds which are thickened with associated mild interloop fluid and   mesenteric edema. Correlate with lactic acid to assess for potential   bowel ischemia though proximal mesenteric vessels appear patent.   Decompression and short term imaging follow-up is advised.    Redemonstrated cystic lesion measuring 2 x 1 cm in the pancreatic   neck/body, appears slightly enlarged. Consider nonemergent pancreatic   protocol MR provided no MR contraindications.    Uncomplicated cholelithiasis.                JIM ZAMORA M.D., ATTENDING RADIOLOGIST  This document has been electronically signed. Oct  9 2018  1:12AM            < end of copied text >    Card: Murmur - syst.  Extra heart sounds-none. S1 normal, S2 (physiologic split).  PMI normal.  VenoVasc: JVP upper normal, legs with no edema.  Leg distal pulses normal, capillary perfusion normal.  Resp: Breathing unlabored.  Auscultation air entry normal. Inspiration normal. Expiration normal. Chest wall- nontender; normal shape.     UResp: Sinuses nontender; pharynx normal.  Nutrition: Nourishment normal; no significant obesity; average height.   Oral: Mouth w/o lesions.   Abd-Pelv: Liver size normal. Abdomen nontender. Abdomen palpation without masses.  Bowel sounds decreased. Pelvis within normal. [facility]   MentalH-Cogni: Calm. A & O x3.    Neur-Madi-Sens: NL coordination. No tremors noted.  Motor exam nonfocal.  Muscle mass normal.   OphthOtol: Orbits normal, EOMI, external ears normal.  Verteb-Skel:   No CVA tenderness. Joints normal passive/active ROM & appear w/o acute pathology.  Derm: Face without lesions. Skin w/o lesions.   HN-Subcutan: Conjunctiva & lips normal pink.  Neck without abnormality. Lymphadenopathy not appreciated.    Gen Appearance: No apparent health distress.  obese  Access via body: IV site clean      Imp/plan  SBO  admitted under surgical team    NPO  NGT to suctioning    GI consult with Dr Encarnacion when stable for GI w/u

## 2018-10-09 NOTE — H&P ADULT - NSHPPHYSICALEXAM_GEN_ALL_CORE
PHYSICAL EXAM:    GENERAL: NAD, well-groomed, well-developed  HEAD:  Atraumatic, Normocephalic  EYES: EOMI, PERRL, conjunctiva and sclera clear  ENMT: Moist mucous membranes, Good dentition, No lesions  NECK: Supple, No JVD  NERVOUS SYSTEM:  Alert & Oriented X3, Good concentration  CHEST/LUNG: Clear to percussion bilaterally; Normal respiratory effort  HEART: Regular rate and rhythm  ABDOMEN: Soft, Nontender, Nondistended; Bowel sounds present  : normal external genitalia  BREASTS: no breast lumps  EXTREMITIES:  No clubbing, cyanosis, or edema  VASC: equal peripheral pulses  LYMPH: No lymphadenopathy noted  SKIN: No rashes or lesions  PSYCH: normal affect

## 2018-10-09 NOTE — PROGRESS NOTE ADULT - SUBJECTIVE AND OBJECTIVE BOX
62 y/o female presenting early this morning with c/o of mid-epigastric pain. PMH of HTN, HLD, UTI, Anxiety, and SBO in the past, but no SurgHx. States that symptoms began Sunday with acid reflux which is unusual for her. States she took Maday-seltzer for the reflux which helped. Yesterday morning she had crampy pain all over the abdomen and dry heaves. States last BM movement and last episode of passing gas was prior to coming to the hospital. States her bowels were hyperactive yesterday. Currently denies pain, nausea, vomiting, fever, chills, and SOB. Denies any BM or passing of gas since being admitted.          T(F): 98.6 (10-09-18 @ 05:05), Max: 98.9 (10-08-18 @ 20:54)  HR: 78 (10-09-18 @ 05:05) (78 - 101)  BP: 101/63 (10-09-18 @ 05:05) (95/64 - 113/73)  RR: 16 (10-09-18 @ 05:05) (16 - 20)  SpO2: 99% (10-09-18 @ 05:05) (97% - 100%)          Nasoenteral Tube: 100 mL        Physical Exam  General: AAOx3, No acute distress  Skin: No jaundice, no icterus  Abdomen: soft, nontender, nondistended, no rebound tenderness, no guarding, no palpable masses  Extremities: non edematous, no calf pain bilaterally

## 2018-10-10 LAB
ALBUMIN SERPL ELPH-MCNC: 3 G/DL — LOW (ref 3.5–5)
ALP SERPL-CCNC: 36 U/L — LOW (ref 40–120)
ALT FLD-CCNC: 12 U/L DA — SIGNIFICANT CHANGE UP (ref 10–60)
ANION GAP SERPL CALC-SCNC: 5 MMOL/L — SIGNIFICANT CHANGE UP (ref 5–17)
APTT BLD: 26.3 SEC — LOW (ref 27.5–37.4)
AST SERPL-CCNC: 11 U/L — SIGNIFICANT CHANGE UP (ref 10–40)
BASOPHILS # BLD AUTO: 0 K/UL — SIGNIFICANT CHANGE UP (ref 0–0.2)
BASOPHILS NFR BLD AUTO: 1.2 % — SIGNIFICANT CHANGE UP (ref 0–2)
BILIRUB SERPL-MCNC: 0.6 MG/DL — SIGNIFICANT CHANGE UP (ref 0.2–1.2)
BUN SERPL-MCNC: 11 MG/DL — SIGNIFICANT CHANGE UP (ref 7–18)
CALCIUM SERPL-MCNC: 8.2 MG/DL — LOW (ref 8.4–10.5)
CHLORIDE SERPL-SCNC: 109 MMOL/L — HIGH (ref 96–108)
CO2 SERPL-SCNC: 26 MMOL/L — SIGNIFICANT CHANGE UP (ref 22–31)
CREAT SERPL-MCNC: 0.86 MG/DL — SIGNIFICANT CHANGE UP (ref 0.5–1.3)
CULTURE RESULTS: SIGNIFICANT CHANGE UP
EOSINOPHIL # BLD AUTO: 0.2 K/UL — SIGNIFICANT CHANGE UP (ref 0–0.5)
EOSINOPHIL NFR BLD AUTO: 6.2 % — HIGH (ref 0–6)
GLUCOSE SERPL-MCNC: 128 MG/DL — HIGH (ref 70–99)
HCT VFR BLD CALC: 34.4 % — LOW (ref 34.5–45)
HGB BLD-MCNC: 11.5 G/DL — SIGNIFICANT CHANGE UP (ref 11.5–15.5)
INR BLD: 1.14 RATIO — SIGNIFICANT CHANGE UP (ref 0.88–1.16)
LYMPHOCYTES # BLD AUTO: 0.8 K/UL — LOW (ref 1–3.3)
LYMPHOCYTES # BLD AUTO: 22.2 % — SIGNIFICANT CHANGE UP (ref 13–44)
MCHC RBC-ENTMCNC: 28.7 PG — SIGNIFICANT CHANGE UP (ref 27–34)
MCHC RBC-ENTMCNC: 33.4 GM/DL — SIGNIFICANT CHANGE UP (ref 32–36)
MCV RBC AUTO: 85.8 FL — SIGNIFICANT CHANGE UP (ref 80–100)
MONOCYTES # BLD AUTO: 0.5 K/UL — SIGNIFICANT CHANGE UP (ref 0–0.9)
MONOCYTES NFR BLD AUTO: 14.4 % — HIGH (ref 2–14)
NEUTROPHILS # BLD AUTO: 2.1 K/UL — SIGNIFICANT CHANGE UP (ref 1.8–7.4)
NEUTROPHILS NFR BLD AUTO: 56 % — SIGNIFICANT CHANGE UP (ref 43–77)
PLATELET # BLD AUTO: 147 K/UL — LOW (ref 150–400)
POTASSIUM SERPL-MCNC: 4 MMOL/L — SIGNIFICANT CHANGE UP (ref 3.5–5.3)
POTASSIUM SERPL-SCNC: 4 MMOL/L — SIGNIFICANT CHANGE UP (ref 3.5–5.3)
PROT SERPL-MCNC: 6.1 G/DL — SIGNIFICANT CHANGE UP (ref 6–8.3)
PROTHROM AB SERPL-ACNC: 12.5 SEC — SIGNIFICANT CHANGE UP (ref 9.8–12.7)
RBC # BLD: 4.01 M/UL — SIGNIFICANT CHANGE UP (ref 3.8–5.2)
RBC # FLD: 11.9 % — SIGNIFICANT CHANGE UP (ref 10.3–14.5)
SODIUM SERPL-SCNC: 140 MMOL/L — SIGNIFICANT CHANGE UP (ref 135–145)
SPECIMEN SOURCE: SIGNIFICANT CHANGE UP
WBC # BLD: 3.8 K/UL — SIGNIFICANT CHANGE UP (ref 3.8–10.5)
WBC # FLD AUTO: 3.8 K/UL — SIGNIFICANT CHANGE UP (ref 3.8–10.5)

## 2018-10-10 PROCEDURE — 99231 SBSQ HOSP IP/OBS SF/LOW 25: CPT

## 2018-10-10 PROCEDURE — 74250 X-RAY XM SM INT 1CNTRST STD: CPT | Mod: 26

## 2018-10-10 RX ORDER — METOPROLOL TARTRATE 50 MG
5 TABLET ORAL EVERY 6 HOURS
Qty: 0 | Refills: 0 | Status: DISCONTINUED | OUTPATIENT
Start: 2018-10-10 | End: 2018-10-11

## 2018-10-10 RX ORDER — ACETAMINOPHEN 500 MG
1000 TABLET ORAL EVERY 6 HOURS
Qty: 0 | Refills: 0 | Status: DISCONTINUED | OUTPATIENT
Start: 2018-10-10 | End: 2018-10-11

## 2018-10-10 RX ORDER — HEPARIN SODIUM 5000 [USP'U]/ML
5000 INJECTION INTRAVENOUS; SUBCUTANEOUS EVERY 8 HOURS
Qty: 0 | Refills: 0 | Status: DISCONTINUED | OUTPATIENT
Start: 2018-10-10 | End: 2018-10-11

## 2018-10-10 RX ADMIN — HEPARIN SODIUM 5000 UNIT(S): 5000 INJECTION INTRAVENOUS; SUBCUTANEOUS at 22:00

## 2018-10-10 RX ADMIN — DEXTROSE MONOHYDRATE, SODIUM CHLORIDE, AND POTASSIUM CHLORIDE 150 MILLILITER(S): 50; .745; 4.5 INJECTION, SOLUTION INTRAVENOUS at 14:21

## 2018-10-10 RX ADMIN — HEPARIN SODIUM 5000 UNIT(S): 5000 INJECTION INTRAVENOUS; SUBCUTANEOUS at 14:11

## 2018-10-10 RX ADMIN — Medication 5 MILLIGRAM(S): at 14:11

## 2018-10-10 NOTE — PROGRESS NOTE ADULT - SUBJECTIVE AND OBJECTIVE BOX
64 y/o female admitted with SBO in virgin abdomen. Patient examined at bedside, no complaints.   No nausea, no vomiting  No flatus or bm     T(F): 99.3 (10-10-18 @ 05:13), Max: 100.5 (10-09-18 @ 21:05)  HR: 85 (10-10-18 @ 05:13) (69 - 86)  BP: 99/73 (10-10-18 @ 05:13) (93/55 - 100/64)  RR: 17 (10-10-18 @ 05:13) (16 - 18)  SpO2: 100% (10-10-18 @ 05:13) (99% - 100%)  Wt(kg): --      10-09 @ 07:01  -  10-10 @ 07:00  --------------------------------------------------------  IN:    dextrose 5% + sodium chloride 0.45% with potassium chloride 20 mEq/L: 1650 mL    IV PiggyBack: 100 mL  Total IN: 1750 mL    OUT:    Nasoenteral Tube: 850 mL  Total OUT: 850 mL    Total NET: 900 mL                            13.3   5.6   )-----------( 171      ( 09 Oct 2018 10:35 )             40.7   10-09    143  |  107  |  19<H>  ----------------------------<  125<H>  3.8   |  27  |  1.01    Ca    8.1<L>      09 Oct 2018 10:35    TPro  8.0  /  Alb  3.9  /  TBili  0.6  /  DBili  x   /  AST  23  /  ALT  21  /  AlkPhos  49  10-08      Physical Exam  General: AAOx3, No acute distress  Skin: No jaundice, no icterus  Abdomen: soft, nontender, mildly distended, tympanic, no rebound tenderness, no guarding, no palpable masses  : Normal external genitalia  Extremities: non edematous, no calf pain bilaterally

## 2018-10-10 NOTE — PROGRESS NOTE ADULT - ASSESSMENT
62 y/o female admitted with SBO in virgin abdomen.    1. Npo   2. NGT  3. IVF hydration   4. SBS today   5. IF does not clinically improve will need OR- likely tomorrow

## 2018-10-10 NOTE — PROGRESS NOTE ADULT - SUBJECTIVE AND OBJECTIVE BOX
note to follow Patient was examined at bedside  feels better    ICU Vital Signs Last 24 Hrs  T(C): 37.6 (10 Oct 2018 14:12), Max: 38.1 (09 Oct 2018 21:05)  T(F): 99.6 (10 Oct 2018 14:12), Max: 100.5 (09 Oct 2018 21:05)  HR: 82 (10 Oct 2018 18:09) (79 - 87)  BP: 92/69 (10 Oct 2018 18:09) (92/69 - 116/64)  BP(mean): --  ABP: --  ABP(mean): --  RR: 17 (10 Oct 2018 14:12) (17 - 18)  SpO2: 100% (10 Oct 2018 14:12) (100% - 100%)                            11.5   3.8   )-----------( 147      ( 10 Oct 2018 08:53 )             34.4   10-10    140  |  109<H>  |  11  ----------------------------<  128<H>  4.0   |  26  |  0.86    Ca    8.2<L>      10 Oct 2018 08:53    TPro  6.1  /  Alb  3.0<L>  /  TBili  0.6  /  DBili  x   /  AST  11  /  ALT  12  /  AlkPhos  36<L>  10-10      MEDICATIONS  (STANDING):  dextrose 5% + sodium chloride 0.45% with potassium chloride 20 mEq/L 1000 milliLiter(s) (150 mL/Hr) IV Continuous <Continuous>  heparin  Injectable 5000 Unit(s) SubCutaneous every 8 hours  metoprolol tartrate Injectable 5 milliGRAM(s) IV Push every 6 hours    MEDICATIONS  (PRN):  acetaminophen  IVPB .. 1000 milliGRAM(s) IV Intermittent every 6 hours PRN Mild Pain (1 - 3)  ondansetron Injectable 4 milliGRAM(s) IV Push every 6 hours PRN Nausea    < from: Xray Small Bowel Series (10.10.18 @ 13:34) >  Partial mechanicalsmall bowel obstruction with a segment of   terminal ileum demonstrating abnormal mucosal appearance corresponding to   a CT described region of small bowel wall thickening.    < end of copied text >    Imp/plan  SBO  NPO  surgical f/u      GI consult with Dr Encarnacion Patient was examined at bedside  feels better    ICU Vital Signs Last 24 Hrs  T(C): 37.6 (10 Oct 2018 14:12), Max: 38.1 (09 Oct 2018 21:05)  T(F): 99.6 (10 Oct 2018 14:12), Max: 100.5 (09 Oct 2018 21:05)  HR: 82 (10 Oct 2018 18:09) (79 - 87)  BP: 92/69 (10 Oct 2018 18:09) (92/69 - 116/64)  BP(mean): --  ABP: --  ABP(mean): --  RR: 17 (10 Oct 2018 14:12) (17 - 18)  SpO2: 100% (10 Oct 2018 14:12) (100% - 100%)                            11.5   3.8   )-----------( 147      ( 10 Oct 2018 08:53 )             34.4   10-10    140  |  109<H>  |  11  ----------------------------<  128<H>  4.0   |  26  |  0.86    Ca    8.2<L>      10 Oct 2018 08:53    TPro  6.1  /  Alb  3.0<L>  /  TBili  0.6  /  DBili  x   /  AST  11  /  ALT  12  /  AlkPhos  36<L>  10-10      MEDICATIONS  (STANDING):  dextrose 5% + sodium chloride 0.45% with potassium chloride 20 mEq/L 1000 milliLiter(s) (150 mL/Hr) IV Continuous <Continuous>  heparin  Injectable 5000 Unit(s) SubCutaneous every 8 hours  metoprolol tartrate Injectable 5 milliGRAM(s) IV Push every 6 hours    MEDICATIONS  (PRN):  acetaminophen  IVPB .. 1000 milliGRAM(s) IV Intermittent every 6 hours PRN Mild Pain (1 - 3)  ondansetron Injectable 4 milliGRAM(s) IV Push every 6 hours PRN Nausea    < from: Xray Small Bowel Series (10.10.18 @ 13:34) >  Partial mechanicalsmall bowel obstruction with a segment of   terminal ileum demonstrating abnormal mucosal appearance corresponding to   a CT described region of small bowel wall thickening.    < end of copied text >    Imp/plan  SBO  surgery rec liquid diet and to remove NGT    GI consult with Dr Encarnacion for possible c/s to visualize TI      HTN - monitor BP, hold Metoprolol if SBP is below 100  GI and DVT prophylaxis Patient was examined at bedside  feels better    ICU Vital Signs Last 24 Hrs  T(C): 37.6 (10 Oct 2018 14:12), Max: 38.1 (09 Oct 2018 21:05)  T(F): 99.6 (10 Oct 2018 14:12), Max: 100.5 (09 Oct 2018 21:05)  HR: 82 (10 Oct 2018 18:09) (79 - 87)  BP: 92/69 (10 Oct 2018 18:09) (92/69 - 116/64)  BP(mean): --  ABP: --  ABP(mean): --  RR: 17 (10 Oct 2018 14:12) (17 - 18)  SpO2: 100% (10 Oct 2018 14:12) (100% - 100%)                            11.5   3.8   )-----------( 147      ( 10 Oct 2018 08:53 )             34.4   10-10    140  |  109<H>  |  11  ----------------------------<  128<H>  4.0   |  26  |  0.86    Ca    8.2<L>      10 Oct 2018 08:53    TPro  6.1  /  Alb  3.0<L>  /  TBili  0.6  /  DBili  x   /  AST  11  /  ALT  12  /  AlkPhos  36<L>  10-10      MEDICATIONS  (STANDING):  dextrose 5% + sodium chloride 0.45% with potassium chloride 20 mEq/L 1000 milliLiter(s) (150 mL/Hr) IV Continuous <Continuous>  heparin  Injectable 5000 Unit(s) SubCutaneous every 8 hours  metoprolol tartrate Injectable 5 milliGRAM(s) IV Push every 6 hours    MEDICATIONS  (PRN):  acetaminophen  IVPB .. 1000 milliGRAM(s) IV Intermittent every 6 hours PRN Mild Pain (1 - 3)  ondansetron Injectable 4 milliGRAM(s) IV Push every 6 hours PRN Nausea    < from: Xray Small Bowel Series (10.10.18 @ 13:34) >  Partial mechanicalsmall bowel obstruction with a segment of   terminal ileum demonstrating abnormal mucosal appearance corresponding to   a CT described region of small bowel wall thickening.    < end of copied text >  Gen Appearance: No apparent health distress.    Card: S1  S2   VenoVasc: JVP upper normal, legs with no edema.    Resp: Breathing unlabored.  Auscultation air entry normal.   Abd-Pelv: Liver size normal. Abdomen nontender. Abdomen palpation without masses.  Bowel sounds normal.     Mental status: Calm. A & O x3.    Neur-Madi-Sens:  Motor exam nonfocal.     Conjunctiva & lips normal pink.    IV site clean      Imp/plan  SBO  surgery rec liquid diet and to remove NGT    GI consult with Dr Encarnacion for possible c/s to visualize TI      HTN - monitor BP, hold Metoprolol if SBP is below 100  GI and DVT prophylaxis

## 2018-10-10 NOTE — PROGRESS NOTE ADULT - ATTENDING COMMENTS
Afebrile  Comfortable     Abd  soft less distended  + BM following UGI/SBFT     Study shows contrast into colon but mildly dilated small bowel with terminal ileum with some mucosal irregularity    Will remove NGT, allow liquids    If tolerates diet will need colonsocopy and attempted visualization of TI, possible repeat x-ray prior to consideration of capsule

## 2018-10-11 ENCOUNTER — TRANSCRIPTION ENCOUNTER (OUTPATIENT)
Age: 63
End: 2018-10-11

## 2018-10-11 VITALS
HEART RATE: 89 BPM | OXYGEN SATURATION: 99 % | RESPIRATION RATE: 17 BRPM | DIASTOLIC BLOOD PRESSURE: 68 MMHG | TEMPERATURE: 98 F | SYSTOLIC BLOOD PRESSURE: 120 MMHG

## 2018-10-11 LAB
ANION GAP SERPL CALC-SCNC: 6 MMOL/L — SIGNIFICANT CHANGE UP (ref 5–17)
BUN SERPL-MCNC: 6 MG/DL — LOW (ref 7–18)
CALCIUM SERPL-MCNC: 8.4 MG/DL — SIGNIFICANT CHANGE UP (ref 8.4–10.5)
CHLORIDE SERPL-SCNC: 110 MMOL/L — HIGH (ref 96–108)
CO2 SERPL-SCNC: 26 MMOL/L — SIGNIFICANT CHANGE UP (ref 22–31)
CREAT SERPL-MCNC: 0.79 MG/DL — SIGNIFICANT CHANGE UP (ref 0.5–1.3)
GLUCOSE SERPL-MCNC: 103 MG/DL — HIGH (ref 70–99)
HCT VFR BLD CALC: 32.4 % — LOW (ref 34.5–45)
HGB BLD-MCNC: 10.6 G/DL — LOW (ref 11.5–15.5)
MCHC RBC-ENTMCNC: 28.9 PG — SIGNIFICANT CHANGE UP (ref 27–34)
MCHC RBC-ENTMCNC: 32.9 GM/DL — SIGNIFICANT CHANGE UP (ref 32–36)
MCV RBC AUTO: 87.8 FL — SIGNIFICANT CHANGE UP (ref 80–100)
PLATELET # BLD AUTO: 136 K/UL — LOW (ref 150–400)
POTASSIUM SERPL-MCNC: 3.8 MMOL/L — SIGNIFICANT CHANGE UP (ref 3.5–5.3)
POTASSIUM SERPL-SCNC: 3.8 MMOL/L — SIGNIFICANT CHANGE UP (ref 3.5–5.3)
RBC # BLD: 3.69 M/UL — LOW (ref 3.8–5.2)
RBC # FLD: 12.2 % — SIGNIFICANT CHANGE UP (ref 10.3–14.5)
SODIUM SERPL-SCNC: 142 MMOL/L — SIGNIFICANT CHANGE UP (ref 135–145)
WBC # BLD: 3.7 K/UL — LOW (ref 3.8–10.5)
WBC # FLD AUTO: 3.7 K/UL — LOW (ref 3.8–10.5)

## 2018-10-11 PROCEDURE — 71045 X-RAY EXAM CHEST 1 VIEW: CPT

## 2018-10-11 PROCEDURE — 99231 SBSQ HOSP IP/OBS SF/LOW 25: CPT

## 2018-10-11 PROCEDURE — 80053 COMPREHEN METABOLIC PANEL: CPT

## 2018-10-11 PROCEDURE — 99285 EMERGENCY DEPT VISIT HI MDM: CPT | Mod: 25

## 2018-10-11 PROCEDURE — 74177 CT ABD & PELVIS W/CONTRAST: CPT

## 2018-10-11 PROCEDURE — 85730 THROMBOPLASTIN TIME PARTIAL: CPT

## 2018-10-11 PROCEDURE — 81001 URINALYSIS AUTO W/SCOPE: CPT

## 2018-10-11 PROCEDURE — 85610 PROTHROMBIN TIME: CPT

## 2018-10-11 PROCEDURE — 96375 TX/PRO/DX INJ NEW DRUG ADDON: CPT

## 2018-10-11 PROCEDURE — 96374 THER/PROPH/DIAG INJ IV PUSH: CPT

## 2018-10-11 PROCEDURE — 85027 COMPLETE CBC AUTOMATED: CPT

## 2018-10-11 PROCEDURE — 74250 X-RAY XM SM INT 1CNTRST STD: CPT

## 2018-10-11 PROCEDURE — 74019 RADEX ABDOMEN 2 VIEWS: CPT

## 2018-10-11 PROCEDURE — 83605 ASSAY OF LACTIC ACID: CPT

## 2018-10-11 PROCEDURE — 87086 URINE CULTURE/COLONY COUNT: CPT

## 2018-10-11 PROCEDURE — 83690 ASSAY OF LIPASE: CPT

## 2018-10-11 PROCEDURE — 80048 BASIC METABOLIC PNL TOTAL CA: CPT

## 2018-10-11 RX ADMIN — HEPARIN SODIUM 5000 UNIT(S): 5000 INJECTION INTRAVENOUS; SUBCUTANEOUS at 06:18

## 2018-10-11 RX ADMIN — DEXTROSE MONOHYDRATE, SODIUM CHLORIDE, AND POTASSIUM CHLORIDE 150 MILLILITER(S): 50; .745; 4.5 INJECTION, SOLUTION INTRAVENOUS at 06:18

## 2018-10-11 RX ADMIN — Medication 5 MILLIGRAM(S): at 06:18

## 2018-10-11 NOTE — DISCHARGE NOTE ADULT - HOSPITAL COURSE
64 y/o F pt with a significant PMHx of anxiety, HTN, HLD, UTI, previous SBO and no significant PSHx presents to the ED c/o mid-epigastric pain x this morning. Pt states she is passing gas and had a normal BM. Pt notes they never figured out why she had SBO in the past. Pt denies fever, chills, nausea, vomiting or any other complaints. NKDA.    Patient was admitted with 3rd episode of SBO with no past surgical history. Patient was managed conservatively with NPO/ NGT. Patient clinically improved. SBS showed thickening at terminal ileum. Patient ngt was removed. Diet was advanced and tolerated. Patient is for d/c home with outpatient colonoscopy.

## 2018-10-11 NOTE — PROGRESS NOTE ADULT - SUBJECTIVE AND OBJECTIVE BOX
Patient examined at bedside, no complaints  No nausea, no vomiting  Denies abdominal pain  Having BMs & passing flatus  Tolerating clears  SBS results appreciated      There is mildly increased caliber of the visualized jejunal and ileal small bowel   loops. Within the right lower quadrant there is a segment of terminal   ileum demonstrating an abnormal mucosal appearance corresponding to a CT   described thickened distal ileal folds at the zone of transition. There   is evidence for free flow of contrast into the colon which demonstrates   unremarkable caliber.          T(F): 98.2 (10-11-18 @ 04:57), Max: 99.6 (10-10-18 @ 14:12)  HR: 79 (10-11-18 @ 04:57) (78 - 87)  BP: 114/60 (10-11-18 @ 04:57) (92/69 - 116/64)  RR: 16 (10-11-18 @ 04:57) (16 - 17)  SpO2: 100% (10-11-18 @ 04:57) (100% - 100%)            Physical Exam  General: AAOx3, No acute distress  Skin: No jaundice, no icterus  Abdomen: soft, nontender, nondistended, no rebound tenderness, no guarding, no palpable masses  Extremities: non edematous, no calf pain bilaterally

## 2018-10-11 NOTE — DISCHARGE NOTE ADULT - CARE PLAN
Principal Discharge DX:	SBO (small bowel obstruction)  Goal:	Tolerate Diet  Assessment and plan of treatment:	Please follow up with Dr. Lezama within 1 week   Will need colonoscopy for further work up   Diet as tolerated

## 2018-10-11 NOTE — PROGRESS NOTE ADULT - SUBJECTIVE AND OBJECTIVE BOX
note to follow Patient was examined at bedside  No complaints  tolerating diet  ICU Vital Signs Last 24 Hrs  T(C): 36.8 (11 Oct 2018 14:33), Max: 37.4 (10 Oct 2018 21:53)  T(F): 98.3 (11 Oct 2018 14:33), Max: 99.3 (10 Oct 2018 21:53)  HR: 89 (11 Oct 2018 14:33) (75 - 89)  BP: 120/68 (11 Oct 2018 14:33) (97/62 - 120/68)  BP(mean): --  ABP: --  ABP(mean): --  RR: 17 (11 Oct 2018 14:33) (16 - 17)  SpO2: 99% (11 Oct 2018 14:33) (99% - 100%)                            10.6   3.7   )-----------( 136      ( 11 Oct 2018 07:48 )             32.4   10-11    142  |  110<H>  |  6<L>  ----------------------------<  103<H>  3.8   |  26  |  0.79    Ca    8.4      11 Oct 2018 07:48    TPro  6.1  /  Alb  3.0<L>  /  TBili  0.6  /  DBili  x   /  AST  11  /  ALT  12  /  AlkPhos  36<L>  10-10      MEDICATIONS  (STANDING):  dextrose 5% + sodium chloride 0.45% with potassium chloride 20 mEq/L 1000 milliLiter(s) (150 mL/Hr) IV Continuous <Continuous>  heparin  Injectable 5000 Unit(s) SubCutaneous every 8 hours  metoprolol tartrate Injectable 5 milliGRAM(s) IV Push every 6 hours      Gen Appearance: No apparent health distress.    Card: S1  S2   VenoVasc: JVP upper normal, legs with no edema.    Resp: Breathing unlabored.  Auscultation air entry normal.   Abd-Pelv: Liver size normal. Abdomen nontender. Abdomen palpation without masses.  Bowel sounds normal.     Mental status: Calm. A & O x3.    Neur-Madi-Sens:  Motor exam nonfocal.     Conjunctiva & lips normal pink.    IV site clean Patient was examined at bedside  No complaints  tolerating diet  ICU Vital Signs Last 24 Hrs  T(C): 36.8 (11 Oct 2018 14:33), Max: 37.4 (10 Oct 2018 21:53)  T(F): 98.3 (11 Oct 2018 14:33), Max: 99.3 (10 Oct 2018 21:53)  HR: 89 (11 Oct 2018 14:33) (75 - 89)  BP: 120/68 (11 Oct 2018 14:33) (97/62 - 120/68)  BP(mean): --  ABP: --  ABP(mean): --  RR: 17 (11 Oct 2018 14:33) (16 - 17)  SpO2: 99% (11 Oct 2018 14:33) (99% - 100%)                            10.6   3.7   )-----------( 136      ( 11 Oct 2018 07:48 )             32.4   10-11    142  |  110<H>  |  6<L>  ----------------------------<  103<H>  3.8   |  26  |  0.79    Ca    8.4      11 Oct 2018 07:48    TPro  6.1  /  Alb  3.0<L>  /  TBili  0.6  /  DBili  x   /  AST  11  /  ALT  12  /  AlkPhos  36<L>  10-10      MEDICATIONS  (STANDING):  dextrose 5% + sodium chloride 0.45% with potassium chloride 20 mEq/L 1000 milliLiter(s) (150 mL/Hr) IV Continuous <Continuous>  heparin  Injectable 5000 Unit(s) SubCutaneous every 8 hours  metoprolol tartrate Injectable 5 milliGRAM(s) IV Push every 6 hours      Gen Appearance: No apparent health distress.    Card: S1  S2   VenoVasc: JVP upper normal, legs with no edema.    Resp: Breathing unlabored.  Auscultation air entry normal.   Abd-Pelv: Liver size normal. Abdomen nontender. Abdomen palpation without masses.  Bowel sounds normal.     Mental status: Calm. A & O x3.    Neur-Madi-Sens:  Motor exam nonfocal.     Conjunctiva & lips normal pink.    IV site clean    Imp/plan  SBO  surgery rec regulr diet as tolerated    GI consult with Dr Encarnacion for possible c/s to visualize TI as oupt      HTN - monitor BP, restart Metorpolol  GI and DVT prophylaxis  d/c planning as per surgery

## 2018-10-11 NOTE — DISCHARGE NOTE ADULT - PATIENT PORTAL LINK FT
You can access the [facility] Patient Portal, offered by [facility], by registering with the following website: [email or web address]

## 2018-10-11 NOTE — DISCHARGE NOTE ADULT - PLAN OF CARE
Tolerate Diet Please follow up with Dr. Lezama within 1 week   Will need colonoscopy for further work up   Diet as tolerated

## 2018-10-11 NOTE — DISCHARGE NOTE ADULT - MEDICATION SUMMARY - MEDICATIONS TO TAKE
I will START or STAY ON the medications listed below when I get home from the hospital:    gabapentin 300 mg oral capsule  --  by mouth 2 times a day  -- Indication: For pain     sertraline 25 mg oral tablet  -- 1 tab(s) by mouth once a day  -- Indication: For depression     Crestor 5 mg oral tablet  -- 1 tab(s) by mouth once a day (at bedtime)  -- Indication: For HLD     hydrochlorothiazide-lisinopril 12.5 mg-20 mg oral tablet  -- 1 tab(s) by mouth once a day  -- Indication: For HTN     metoprolol 50 mg oral tablet, extended release  -- 1 tab(s) by mouth once a day  -- Indication: For htn     Folplex Vitamin B Complex with Folic Acid oral tablet  -- 1 tab(s) by mouth once a day  -- Indication: For vitamin

## 2018-10-11 NOTE — PROGRESS NOTE ADULT - ASSESSMENT
64 y/o female with a resolved SBO, Terminal Ileal mucosal thickening on SBS, possible TI stricture?      1. Advance to regular diet  2. Out of bed  3. d/c planning  4. Will need o/p colonoscopy to r/o Crohn's Disease

## 2018-11-03 ENCOUNTER — TRANSCRIPTION ENCOUNTER (OUTPATIENT)
Age: 63
End: 2018-11-03

## 2018-12-16 ENCOUNTER — TRANSCRIPTION ENCOUNTER (OUTPATIENT)
Age: 63
End: 2018-12-16

## 2019-04-14 NOTE — ED ADULT NURSE NOTE - MODE OF DISCHARGE
HPI: 39 yo F with previous history of spontaneous DVT LLE Aug 2018 that has since told to stop anticoagulants in Dec 2018 that presents with LLE pain, posterior calf, dull, achy, non radiating, leading to limp that all started yday, no associated numbness/paresthesias, and not preceded by trauma. Denies erythema, fever, chills, SOB, chest pain. No immobility, no OCP use, no smoking, no famhx of blood clots. LMP April 1. no UTI/URI symptoms, headache, vision/hearing changes.   EXAM: NAD, lungs ctab, heart RRR, BLE without pitting edema, LLE without swelling, erythema, but tenderness to palpation in posterior calf, pop and dp pulses palpable.   MDM: concern for DVT given previous history of such last year although grossly normal physical exam. Will obtain US and reassess.
Ambulatory

## 2019-10-25 NOTE — ED ADULT NURSE NOTE - NS ED NOTE ABUSE SUSPICION NEGLECT YN
Improved, but still symptomatic  D/C Cymbalta 60mg QD due to worsening depression  Start Effexor XR 75mg QD  Resume counseling  Smartphone abel list previously given 
Pending labs  S/p Christal  Continue OTC MVI and Vitamin D 3,000IU daily 
Stable  Recommend lifestyle modifications 
Worsening  D/C Cymbalta 60mg QD due to worsening depression  Start Effexor XR 75mg QD  Resume counseling  Smartphone abel list previously given 
No

## 2021-01-04 NOTE — PROGRESS NOTE ADULT - ASSESSMENT
Pt with hx as above here with SBO,now resolved and abnormal EKG.  1.Repalce K+  2. Resume po medication.  3. Stress test to r/o ischemia, can be done as outpatient.  4. GI and DVT prophylaxis. Dupixent Pregnancy And Lactation Text: This medication likely crosses the placenta but the risk for the fetus is uncertain. This medication is excreted in breast milk.

## 2021-04-12 ENCOUNTER — TRANSCRIPTION ENCOUNTER (OUTPATIENT)
Age: 66
End: 2021-04-12

## 2021-09-17 ENCOUNTER — TRANSCRIPTION ENCOUNTER (OUTPATIENT)
Age: 66
End: 2021-09-17

## 2021-09-18 ENCOUNTER — EMERGENCY (EMERGENCY)
Facility: HOSPITAL | Age: 66
LOS: 1 days | Discharge: HOME | End: 2021-09-18
Attending: STUDENT IN AN ORGANIZED HEALTH CARE EDUCATION/TRAINING PROGRAM
Payer: COMMERCIAL

## 2021-09-18 VITALS
WEIGHT: 169.76 LBS | HEART RATE: 79 BPM | HEIGHT: 60 IN | TEMPERATURE: 98 F | DIASTOLIC BLOOD PRESSURE: 72 MMHG | SYSTOLIC BLOOD PRESSURE: 137 MMHG | RESPIRATION RATE: 18 BRPM | OXYGEN SATURATION: 95 %

## 2021-09-18 PROCEDURE — 90471 IMMUNIZATION ADMIN: CPT

## 2021-09-18 PROCEDURE — 70450 CT HEAD/BRAIN W/O DYE: CPT | Mod: 26,MA

## 2021-09-18 PROCEDURE — 99284 EMERGENCY DEPT VISIT MOD MDM: CPT | Mod: 25

## 2021-09-18 PROCEDURE — 70450 CT HEAD/BRAIN W/O DYE: CPT | Mod: MA

## 2021-09-18 PROCEDURE — 99284 EMERGENCY DEPT VISIT MOD MDM: CPT

## 2021-09-18 PROCEDURE — 90715 TDAP VACCINE 7 YRS/> IM: CPT

## 2021-09-18 RX ORDER — TETANUS TOXOID, REDUCED DIPHTHERIA TOXOID AND ACELLULAR PERTUSSIS VACCINE, ADSORBED 5; 2.5; 8; 8; 2.5 [IU]/.5ML; [IU]/.5ML; UG/.5ML; UG/.5ML; UG/.5ML
0.5 SUSPENSION INTRAMUSCULAR ONCE
Refills: 0 | Status: COMPLETED | OUTPATIENT
Start: 2021-09-18 | End: 2021-09-18

## 2021-09-18 RX ADMIN — TETANUS TOXOID, REDUCED DIPHTHERIA TOXOID AND ACELLULAR PERTUSSIS VACCINE, ADSORBED 0.5 MILLILITER(S): 5; 2.5; 8; 8; 2.5 SUSPENSION INTRAMUSCULAR at 13:26

## 2021-09-18 NOTE — ED PROVIDER NOTE - PROGRESS NOTE DETAILS
Plastics Dr. Gieger at bedside performing laceration repair. Laceration repair complete.  CT negative.  Patient nontoxic and medically stable for discharge. Results as applicable discussed with patient. Return precautions provided and patient understands to return to the ED for concerning or worsening signs and symptoms. Instructed to follow up with Dr. Geiger and PCP and agreeable. Patient's questions answered.

## 2021-09-18 NOTE — ED PROVIDER NOTE - ATTENDING CONTRIBUTION TO CARE
I performed the initial face to face bedside interview with this patient regarding history of present illness, review of symptoms and past medical, social and family history.  I completed an independent physical examination.  I was the initial provider who evaluated this patient.  The history, review of symptoms and examination was documented by the scribe in my presence and I attest to the accuracy of the documentation.  I have signed out the follow up of any pending tests (i.e. labs, radiological studies) to the PA/NP.  I have discussed the patient’s plan of care and disposition with the PA/NP.   well appearing female, no acute distress, normal work of breathing, normal gait. laceration above left eyebrow. plastic surgery to repair.

## 2021-09-18 NOTE — ED PROVIDER NOTE - CARE PROVIDER_API CALL
Deepak Geiger)  Plastic Surgery  [address] [place] [address], [address] [place] [id number]  Phone: [phone]  Fax: [phone]  Follow Up Time:

## 2021-09-18 NOTE — ED PROVIDER NOTE - PATIENT PORTAL LINK FT
You can access the FollowMyHealth Patient Portal offered by [facility] by registering at the following website: [email or web address]. By joining [employer]’s FollowMyHealth portal, you will also be able to view your health information using other applications (apps) compatible with our system.

## 2021-09-18 NOTE — ED PROVIDER NOTE - OBJECTIVE STATEMENT
67 y/o F, w/ hx of HTN, HLD, and anxiety, presents for head injury after trip and fall. Patient states about 45 minutes ago tripped and fell onto concrete from standing height and hit left forehead. Patient denies loss of consciousness, vomiting, or visual changes. Patient states she has laceration to left forehead. Patient believes last tetanus shot was about 5 years ago, however was not positive. Patient denies taking any platelet or anticoagulant medications. Patient denies any other symptoms. NKDA.

## 2021-09-18 NOTE — ED PROVIDER NOTE - NSICDXPASTMEDICALHX_GEN_ALL_CORE_FT
PAST MEDICAL HISTORY:  Anxiety     Gastritis     Heart murmur     Herpes simplex     HTN (hypertension)     Hypercholesteremia     SBO (small bowel obstruction)     UTI (urinary tract infection)       HLD (hyperlipidemia)

## 2021-09-18 NOTE — ED PROVIDER NOTE - CLINICAL SUMMARY MEDICAL DECISION MAKING FREE TEXT BOX
65 y/o F w/ trip and fall w/ laceration to left forehead. Given age, will do CT head. Given patient is unsure about last tetanus shot, will provide tetanus shot in ED. Will repair laceration. 65 y/o F w/ trip and fall w/ laceration to left forehead. Given age, will do CT head. Given patient is unsure about last tetanus shot, will provide tetanus shot in ED. Patient requesting plastics due to laceration on face, will consult.

## 2021-09-23 ENCOUNTER — EMERGENCY (EMERGENCY)
Facility: HOSPITAL | Age: 66
LOS: 1 days | Discharge: HOME | End: 2021-09-23
Attending: EMERGENCY MEDICINE
Payer: COMMERCIAL

## 2021-09-23 VITALS
OXYGEN SATURATION: 99 % | WEIGHT: 160.06 LBS | TEMPERATURE: 99 F | DIASTOLIC BLOOD PRESSURE: 82 MMHG | HEART RATE: 72 BPM | RESPIRATION RATE: 18 BRPM | SYSTOLIC BLOOD PRESSURE: 118 MMHG | HEIGHT: 60 IN

## 2021-09-23 PROBLEM — E78.5 HYPERLIPIDEMIA, UNSPECIFIED: Chronic | Status: ACTIVE | Noted: 2021-09-18

## 2021-09-23 PROCEDURE — L9995: CPT

## 2021-09-23 PROCEDURE — G0463: CPT

## 2021-09-23 NOTE — ED PROVIDER NOTE - PHYSICAL EXAMINATION
lac to left eyebrow healing well  running suture and simple interrupted  scab removed  birth win/ mole by wound site

## 2021-09-23 NOTE — ED PROVIDER NOTE - CONSTITUTIONAL, MLM
Advance Care Planning    Advance Care Planning (ACP) Provider Note - Comprehensive     Date of ACP Conversation: 09/18/18  Persons included in Conversation:  patient and family  Length of ACP Conversation in minutes:  16 minutes    Authorized Decision Maker (if patient is incapable of making informed decisions): This person is: wife  Healthcare Agent/Medical Power of  under Advance Directive          General ACP for ALL Patients with Decision Making Capacity:   Importance of advance care planning, including choosing a healthcare agent to communicate patient's healthcare decisions if patient lost the ability to make decisions, such as after a sudden illness or accident  Understanding of the healthcare agent role was assessed and information provided  Exploration of values, goals, and preferences if recovery is not expected, even with continued medical treatment in the event of: Imminent death  Severe, permanent brain injury    Review of Existing Advance Directive:  Does this advance directive still reflect your preferences? Yes (Provide new form/Refer for assistance in updating)    For Serious or Chronic Illness:  Understanding of medical condition    Understanding of CPR, goals and expected outcomes, benefits and burdens discussed.     Interventions Provided:  Recommended communicating the plan and making copies for the healthcare agent, personal physician, and others as appropriate (e.g., health system)  Recommended review of completed ACP document annually or upon change in health status Well appearing, awake, alert, oriented to person, place, time/situation and in no apparent distress. normal...

## 2021-09-23 NOTE — ED PROVIDER NOTE - NSICDXPASTMEDICALHX_GEN_ALL_CORE_FT
PAST MEDICAL HISTORY:  Anxiety     Gastritis     Heart murmur     Herpes simplex     HLD (hyperlipidemia)     HTN (hypertension)     Hypercholesteremia     SBO (small bowel obstruction)     UTI (urinary tract infection)

## 2021-09-23 NOTE — ED PROVIDER NOTE - OBJECTIVE STATEMENT
66 y.o female with no significant history presenting with cc of suture check. Patient fell 1 week ago and was given sutures by a plastic surgeon. Patient has no complaints. Patient is here to have her sutures removed. KEVIN. 66 y.o female where for suture removal. Patient fell 1 week ago and was sutured by a plastic surgeon here in this ED. Patient has no complaints. Patient is here to have her sutures removed. KEVIN.  unable to follow up in his office, was advicesd to come here

## 2021-09-23 NOTE — ED PROVIDER NOTE - PATIENT PORTAL LINK FT
You can access the FollowMyHealth Patient Portal offered by [facility] by registering at the following website: [email or web address]. By joining [employer]’s FollowMyHealth portal, you will also be able to view your health information using other applications (apps) compatible with our system.

## 2021-12-13 ENCOUNTER — INPATIENT (INPATIENT)
Facility: HOSPITAL | Age: 66
LOS: 4 days | Discharge: HOME | DRG: 389 | End: 2021-12-18
Attending: SURGERY | Admitting: SURGERY
Payer: COMMERCIAL

## 2021-12-13 VITALS
RESPIRATION RATE: 16 BRPM | HEART RATE: 90 BPM | HEIGHT: 60 IN | SYSTOLIC BLOOD PRESSURE: 118 MMHG | WEIGHT: 164.91 LBS | DIASTOLIC BLOOD PRESSURE: 80 MMHG | OXYGEN SATURATION: 99 % | TEMPERATURE: 98 F

## 2021-12-13 DIAGNOSIS — K56.609 UNSPECIFIED INTESTINAL OBSTRUCTION, UNSPECIFIED AS TO PARTIAL VERSUS COMPLETE OBSTRUCTION: ICD-10-CM

## 2021-12-13 LAB
ALBUMIN SERPL ELPH-MCNC: 3.8 G/DL — SIGNIFICANT CHANGE UP (ref 3.5–5)
ALP SERPL-CCNC: 61 U/L — SIGNIFICANT CHANGE UP (ref 40–120)
ALT FLD-CCNC: 30 U/L DA — SIGNIFICANT CHANGE UP (ref 10–60)
ANION GAP SERPL CALC-SCNC: 9 MMOL/L — SIGNIFICANT CHANGE UP (ref 5–17)
AST SERPL-CCNC: 31 U/L — SIGNIFICANT CHANGE UP (ref 10–40)
BASOPHILS # BLD AUTO: 0.03 K/UL — SIGNIFICANT CHANGE UP (ref 0–0.2)
BASOPHILS NFR BLD AUTO: 0.2 % — SIGNIFICANT CHANGE UP (ref 0–2)
BILIRUB SERPL-MCNC: 0.9 MG/DL — SIGNIFICANT CHANGE UP (ref 0.2–1.2)
BUN SERPL-MCNC: 25 MG/DL — HIGH (ref 7–18)
CALCIUM SERPL-MCNC: 10.2 MG/DL — SIGNIFICANT CHANGE UP (ref 8.4–10.5)
CHLORIDE SERPL-SCNC: 104 MMOL/L — SIGNIFICANT CHANGE UP (ref 96–108)
CO2 SERPL-SCNC: 27 MMOL/L — SIGNIFICANT CHANGE UP (ref 22–31)
CREAT SERPL-MCNC: 1.25 MG/DL — SIGNIFICANT CHANGE UP (ref 0.5–1.3)
EOSINOPHIL # BLD AUTO: 0.02 K/UL — SIGNIFICANT CHANGE UP (ref 0–0.5)
EOSINOPHIL NFR BLD AUTO: 0.1 % — SIGNIFICANT CHANGE UP (ref 0–6)
GLUCOSE SERPL-MCNC: 166 MG/DL — HIGH (ref 70–99)
HCT VFR BLD CALC: 45.8 % — HIGH (ref 34.5–45)
HGB BLD-MCNC: 15.4 G/DL — SIGNIFICANT CHANGE UP (ref 11.5–15.5)
IMM GRANULOCYTES NFR BLD AUTO: 0.4 % — SIGNIFICANT CHANGE UP (ref 0–1.5)
LIDOCAIN IGE QN: 343 U/L — SIGNIFICANT CHANGE UP (ref 73–393)
LYMPHOCYTES # BLD AUTO: 0.38 K/UL — LOW (ref 1–3.3)
LYMPHOCYTES # BLD AUTO: 2.5 % — LOW (ref 13–44)
MCHC RBC-ENTMCNC: 28.4 PG — SIGNIFICANT CHANGE UP (ref 27–34)
MCHC RBC-ENTMCNC: 33.6 GM/DL — SIGNIFICANT CHANGE UP (ref 32–36)
MCV RBC AUTO: 84.3 FL — SIGNIFICANT CHANGE UP (ref 80–100)
MONOCYTES # BLD AUTO: 0.83 K/UL — SIGNIFICANT CHANGE UP (ref 0–0.9)
MONOCYTES NFR BLD AUTO: 5.5 % — SIGNIFICANT CHANGE UP (ref 2–14)
NEUTROPHILS # BLD AUTO: 13.77 K/UL — HIGH (ref 1.8–7.4)
NEUTROPHILS NFR BLD AUTO: 91.3 % — HIGH (ref 43–77)
NRBC # BLD: 0 /100 WBCS — SIGNIFICANT CHANGE UP (ref 0–0)
PLATELET # BLD AUTO: 237 K/UL — SIGNIFICANT CHANGE UP (ref 150–400)
POTASSIUM SERPL-MCNC: 4.1 MMOL/L — SIGNIFICANT CHANGE UP (ref 3.5–5.3)
POTASSIUM SERPL-SCNC: 4.1 MMOL/L — SIGNIFICANT CHANGE UP (ref 3.5–5.3)
PROT SERPL-MCNC: 8.9 G/DL — HIGH (ref 6–8.3)
RBC # BLD: 5.43 M/UL — HIGH (ref 3.8–5.2)
RBC # FLD: 12.9 % — SIGNIFICANT CHANGE UP (ref 10.3–14.5)
SARS-COV-2 RNA SPEC QL NAA+PROBE: SIGNIFICANT CHANGE UP
SODIUM SERPL-SCNC: 140 MMOL/L — SIGNIFICANT CHANGE UP (ref 135–145)
WBC # BLD: 15.09 K/UL — HIGH (ref 3.8–10.5)
WBC # FLD AUTO: 15.09 K/UL — HIGH (ref 3.8–10.5)

## 2021-12-13 PROCEDURE — 74177 CT ABD & PELVIS W/CONTRAST: CPT | Mod: 26,MG

## 2021-12-13 PROCEDURE — 99285 EMERGENCY DEPT VISIT HI MDM: CPT

## 2021-12-13 PROCEDURE — 99221 1ST HOSP IP/OBS SF/LOW 40: CPT

## 2021-12-13 PROCEDURE — G1004: CPT

## 2021-12-13 PROCEDURE — 99222 1ST HOSP IP/OBS MODERATE 55: CPT

## 2021-12-13 RX ORDER — SODIUM CHLORIDE 9 MG/ML
1000 INJECTION INTRAMUSCULAR; INTRAVENOUS; SUBCUTANEOUS
Refills: 0 | Status: DISCONTINUED | OUTPATIENT
Start: 2021-12-13 | End: 2021-12-15

## 2021-12-13 RX ORDER — ONDANSETRON 8 MG/1
4 TABLET, FILM COATED ORAL EVERY 6 HOURS
Refills: 0 | Status: DISCONTINUED | OUTPATIENT
Start: 2021-12-13 | End: 2021-12-18

## 2021-12-13 RX ORDER — ONDANSETRON 8 MG/1
4 TABLET, FILM COATED ORAL ONCE
Refills: 0 | Status: COMPLETED | OUTPATIENT
Start: 2021-12-13 | End: 2021-12-13

## 2021-12-13 RX ORDER — SODIUM CHLORIDE 9 MG/ML
1000 INJECTION INTRAMUSCULAR; INTRAVENOUS; SUBCUTANEOUS ONCE
Refills: 0 | Status: COMPLETED | OUTPATIENT
Start: 2021-12-13 | End: 2021-12-13

## 2021-12-13 RX ORDER — SERTRALINE 25 MG/1
1 TABLET, FILM COATED ORAL
Qty: 0 | Refills: 0 | DISCHARGE

## 2021-12-13 RX ORDER — ACETAMINOPHEN 500 MG
1000 TABLET ORAL ONCE
Refills: 0 | Status: COMPLETED | OUTPATIENT
Start: 2021-12-13 | End: 2021-12-13

## 2021-12-13 RX ORDER — BENZOCAINE AND MENTHOL 5; 1 G/100ML; G/100ML
1 LIQUID ORAL EVERY 4 HOURS
Refills: 0 | Status: DISCONTINUED | OUTPATIENT
Start: 2021-12-13 | End: 2021-12-18

## 2021-12-13 RX ORDER — METOPROLOL TARTRATE 50 MG
2.5 TABLET ORAL EVERY 6 HOURS
Refills: 0 | Status: DISCONTINUED | OUTPATIENT
Start: 2021-12-13 | End: 2021-12-15

## 2021-12-13 RX ORDER — METOPROLOL TARTRATE 50 MG
1 TABLET ORAL
Qty: 0 | Refills: 0 | DISCHARGE

## 2021-12-13 RX ORDER — HEPARIN SODIUM 5000 [USP'U]/ML
5000 INJECTION INTRAVENOUS; SUBCUTANEOUS EVERY 8 HOURS
Refills: 0 | Status: DISCONTINUED | OUTPATIENT
Start: 2021-12-13 | End: 2021-12-18

## 2021-12-13 RX ADMIN — SODIUM CHLORIDE 125 MILLILITER(S): 9 INJECTION INTRAMUSCULAR; INTRAVENOUS; SUBCUTANEOUS at 22:05

## 2021-12-13 RX ADMIN — HEPARIN SODIUM 5000 UNIT(S): 5000 INJECTION INTRAVENOUS; SUBCUTANEOUS at 15:04

## 2021-12-13 RX ADMIN — SODIUM CHLORIDE 1000 MILLILITER(S): 9 INJECTION INTRAMUSCULAR; INTRAVENOUS; SUBCUTANEOUS at 09:09

## 2021-12-13 RX ADMIN — Medication 1000 MILLIGRAM(S): at 17:41

## 2021-12-13 RX ADMIN — SODIUM CHLORIDE 1000 MILLILITER(S): 9 INJECTION INTRAMUSCULAR; INTRAVENOUS; SUBCUTANEOUS at 12:07

## 2021-12-13 RX ADMIN — SODIUM CHLORIDE 125 MILLILITER(S): 9 INJECTION INTRAMUSCULAR; INTRAVENOUS; SUBCUTANEOUS at 15:04

## 2021-12-13 RX ADMIN — HEPARIN SODIUM 5000 UNIT(S): 5000 INJECTION INTRAVENOUS; SUBCUTANEOUS at 22:05

## 2021-12-13 RX ADMIN — Medication 400 MILLIGRAM(S): at 17:24

## 2021-12-13 RX ADMIN — ONDANSETRON 4 MILLIGRAM(S): 8 TABLET, FILM COATED ORAL at 12:07

## 2021-12-13 RX ADMIN — ONDANSETRON 4 MILLIGRAM(S): 8 TABLET, FILM COATED ORAL at 15:04

## 2021-12-13 RX ADMIN — ONDANSETRON 4 MILLIGRAM(S): 8 TABLET, FILM COATED ORAL at 08:54

## 2021-12-13 NOTE — ED ADULT NURSE NOTE - ED STAT RN HANDOFF DETAILS
Report given to KENNETH DAUGHERTY Pt resting in bed, no acute distress noted, denies chest pain, no shortness of breath indicated.

## 2021-12-13 NOTE — CONSULT NOTE ADULT - PROBLEM SELECTOR RECOMMENDATION 9
small bowel obstruction with transition point at distal ileum  NPO with IVF  surgery on board  NGT   patient will need colonoscopy once SBO resolved patient with virgin abdomen and hx of multiple small bowel obstructions with transition point at distal ileum  NPO with IVF  surgery on board  NGT   patient will need colonoscopy once SBO resolved to r/o IBD patient with virgin abdomen and hx of multiple small bowel obstructions with transition point at distal ileum  NPO with IVF  surgery on board  NGT (consider sedation)  patient will need colonoscopy once SBO resolved to r/o IBD patient with virgin abdomen and hx of multiple small bowel obstructions with transition point at distal ileum  NPO with IVF  surgery on board  NGT (consider sedation)  patient will need colonoscopy once SBO resolved to r/o IBD  send stool for calprotectin once bowel function returns

## 2021-12-13 NOTE — PATIENT PROFILE ADULT - FALL HARM RISK - RISK INTERVENTIONS

## 2021-12-13 NOTE — H&P ADULT - NSHPLABSRESULTS_GEN_ALL_CORE
LABS:                        15.4   15.09 )-----------( 237      ( 13 Dec 2021 08:37 )             45.8     12-13    140  |  104  |  25<H>  ----------------------------<  166<H>  4.1   |  27  |  1.25    Ca    10.2      13 Dec 2021 08:37    TPro  8.9<H>  /  Alb  3.8  /  TBili  0.9  /  DBili  x   /  AST  31  /  ALT  30  /  AlkPhos  61  12-13    RADIOLOGY & ADDITIONAL STUDIES:  < from: CT Abdomen and Pelvis w/ IV Cont (12.13.21 @ 10:41) >    ACC: [id number] EXAM:  CT ABDOMEN AND PELVIS IC                          PROCEDURE DATE:  12/13/2021          INTERPRETATION:  CLINICAL INFORMATION: Nausea and vomiting.    COMPARISON: October 9, 2018.    CONTRAST/COMPLICATIONS:  IV Contrast: Omnipaque 350.  90 mL administered.   10 mL discarded.  Oral Contrast: None.  Complications: None.    PROCEDURE:  CT of the Abdomen and Pelvis was performed.  Sagittal and coronal reformats were performed.    FINDINGS:  LOWER CHEST: Within normal limits.    LIVER: Within normal limits.  BILE DUCTS: Normal caliber.  GALLBLADDER: Cholelithiasis.  SPLEEN: Multiple cysts hypodense lesions throughout the spleen, some of   which have increased in size, including the largest one which measures   1.4 cm (series 2, image 53), previously 1.1 cm.  PANCREAS: Increased size of cystic lesion in the pancreatic neck (series   2, image 35), measuring 3.0 cm, previously 2.0 cm.  ADRENALS: Unchanged left adrenal parenchymal calcifications. Unchanged   tiny right adrenal nodule, measuring 0.6 cm.  KIDNEYS/URETERS: No hydronephrosis. Subcentimeter hypodense left renal   lesions, too small to characterize.    BLADDER: Within normal limits.  REPRODUCTIVE ORGANS: Uterine leiomyomas.    BOWEL: Small bowel obstruction with transition point in the distal ileum   (series 2, image 97). Appendix is normal.  PERITONEUM: No ascites.  VESSELS: Atherosclerotic changes.  RETROPERITONEUM/LYMPH NODES: No lymphadenopathy.  ABDOMINAL WALL: Small hiatal hernia.  BONES: Degenerativechanges.    IMPRESSION:  Small bowel obstruction with transition point in the distal ileum.    Increased size of cystic lesion in the pancreatic neck, measuring 3.0 cm,   previously 2.0 cm. Further evaluation with MRCP with contrast is   recommended.    Multiple indeterminate splenic lesions, some of which have slightly   increased in size.    --- End of Report ---    HILDA BUSCH MD; Attending Radiologist  This document has been electronically signed. Dec 13 2021 10:49AM    < end of copied text >

## 2021-12-13 NOTE — H&P ADULT - ASSESSMENT
66 year old female with small bowel obstruction with transition point at distal ileum, similar in location to previous bowel obstructions.   PMH HTN, HLD, anxiety    - admit to Dr. Lezama  - NGT to continuous low wall suction   - NPO with IVF  - repeat labs  - possible GI consult  - IV medications  - await return of bowel function   - no pain medication  - DVT prophylaxis, IS, OOB, ambulation   - discussed with Dr. Lezama

## 2021-12-13 NOTE — H&P ADULT - NSHPLANGLIMITEDENGLISH_GEN_A_CORE
· Obtain blood work to check hemoglobin and iron levels  · Can take 50 mg of losartan a day instead of 100 mg  · Start verapamil extended-release capsule once a day 
No

## 2021-12-13 NOTE — CONSULT NOTE ADULT - ASSESSMENT
66 year old female with PMH HTN, HLD, anxiety and bowel obstruction x 4 over the past 10 years. She presents to the ED c/o diffuse abdominal pain beginning 3 days ago which has improved, nausea and vomiting x1 66 year old female with PMH HTN, HLD, anxiety and bowel obstruction x 4 over the past 10 years. She presents to the ED c/o diffuse abdominal pain beginning 3 days ago which has improved, nausea and vomiting x1. Patient denies ever having surgery.

## 2021-12-13 NOTE — H&P ADULT - HISTORY OF PRESENT ILLNESS
66 year old female with PMH HTN, HLD, anxiety and bowel obstruction x 3 over the past 10 years with no PSH presents to the ED complaining of diffuse abdominal pain which has improved, nausea and vomiting x1. Last BM or flatus was Saturday. History of gastric ulcer in the past, but unsure how long ago. Denies GI workup with colonoscopy or capsule endoscopy despite prior recommendations. Denies weight loss, constipation, diarrhea between episodes, chest pain, shortness of breath, fever, chills, dysuria.

## 2021-12-13 NOTE — H&P ADULT - NSHPPHYSICALEXAM_GEN_ALL_CORE
Vital Signs Last 24 Hrs  T(C): 37.5 (13 Dec 2021 07:00), Max: 37.5 (13 Dec 2021 07:00)  T(F): 99.5 (13 Dec 2021 07:00), Max: 99.5 (13 Dec 2021 07:00)  HR: 91 (13 Dec 2021 07:00) (90 - 91)  BP: 100/72 (13 Dec 2021 07:00) (100/72 - 118/80)  RR: 16 (13 Dec 2021 07:00) (16 - 16)  SpO2: 99% (13 Dec 2021 07:00) (99% - 99%)    Physical Exam:    General:  Appears stated age, well-groomed, well-nourished, no distress  Eyes: EOMI  HENT:  WNL, no JVD  Chest: respirations nonlabored  Cardiovascular:  Regular rate & rhythm  Abdomen: soft, mild distention, nontender    Extremities: no edema bilaterally  Skin: warm and dry  Musculoskeletal: no calf tenderness  Vascular: radial pulse 2+ bilaterally, DP 2+ bilaterally  Neuro:  Alert, oriented to time, place and person   Psych: normal affect

## 2021-12-13 NOTE — ED ADULT NURSE NOTE - OBJECTIVE STATEMENT
Pt c/o generalized abdominal pain with N/V x today. Pt denies any abdominal pain at this time. No acute distress noted, denies chest pain, no shortness of breath indicated.

## 2021-12-13 NOTE — ED PROVIDER NOTE - PROGRESS NOTE DETAILS
Patient resting comfortably, feels mildly improved. Requests more zofran. Spoke to surgery PA who will come see patient. Patient accepted to Dr. Banerjee's service. Patient informed of incidental pancreas and spleen findings.

## 2021-12-13 NOTE — CONSULT NOTE ADULT - SUBJECTIVE AND OBJECTIVE BOX
[facility]    Patient is a 66y old  Female who presents with a chief complaint of small bowel obstruction (13 Dec 2021 11:40)    HPI:  66 year old female with PMH HTN, HLD, anxiety and bowel obstruction x 3 over the past 10 years with no PSH presents to the ED complaining of diffuse abdominal pain which has improved, nausea and vomiting x1. Last BM or flatus was Saturday. History of gastric ulcer in the past, but unsure how long ago. Denies GI workup with colonoscopy or capsule endoscopy despite prior recommendations. Denies weight loss, constipation, diarrhea between episodes, chest pain, shortness of breath, fever, chills, dysuria.  (13 Dec 2021 11:40)    PMH/PSH:  PAST MEDICAL & SURGICAL HISTORY:  Anxiety    HTN (hypertension)    Herpes simplex    Hypercholesteremia    Heart murmur    UTI (urinary tract infection)    SBO (small bowel obstruction)    Gastritis    HLD (hyperlipidemia)    No significant past surgical history      FH:  FAMILY HISTORY:      MEDS:  MEDICATIONS  (STANDING):  enalaprilat Injectable 1.25 milliGRAM(s) IV Push every 6 hours  heparin   Injectable 5000 Unit(s) SubCutaneous every 8 hours  metoprolol tartrate Injectable 2.5 milliGRAM(s) IV Push every 6 hours  sodium chloride 0.9%. 1000 milliLiter(s) (125 mL/Hr) IV Continuous <Continuous>    MEDICATIONS  (PRN):  ondansetron Injectable 4 milliGRAM(s) IV Push every 6 hours PRN Nausea and/or Vomiting    Allergies    No Known Allergies    Intolerances            CONSTITUTIONAL:  No weight loss, fever, chills, weakness or fatigue.  HEENT:  Eyes:  No visual loss, blurred vision, double vision or yellow sclerae. Ears, Nose, Throat:  No hearing loss, sneezing, congestion, runny nose or sore throat.  SKIN:  No rash or itching.  CARDIOVASCULAR:  No chest pain, chest pressure or chest discomfort. No palpitations or edema.  RESPIRATORY:  No shortness of breath, cough or sputum.  GASTROINTESTINAL:  SEE HPI  GENITOURINARY:  No dysuria, hematuria, urinary frequency  NEUROLOGICAL:  No headache, dizziness, syncope, paralysis, ataxia, numbness or tingling in the extremities. No change in bowel or bladder control.  MUSCULOSKELETAL:  No muscle, back pain, joint pain or stiffness.  HEMATOLOGIC:  No anemia, bleeding or bruising.  LYMPHATICS:  No enlarged nodes. No history of splenectomy.  PSYCHIATRIC:  No history of depression or anxiety.  ENDOCRINOLOGIC:  No reports of sweating, cold or heat intolerance. No polyuria or polydipsia.    GI HPI: Patient seen and examined. States abdominal pain improving. Denies weight loss, constipation, or diarrhea. No chest pain, shortness of breath, fever, chills, dysuria. No hematemesis, hematochezia or melena. Patient last BM on Saturday which she describes as semi soft yellowish stool. Patient denies ever having an colonoscopy but states she had a normal sigmoidoscopy maybe 10 years ago        ______________________________________________________________________  PHYSICAL EXAM:  T(C): 37.5 (12-13-21 @ 07:00), Max: 37.5 (12-13-21 @ 07:00)  HR: 91 (12-13-21 @ 13:31)  BP: 105/75 (12-13-21 @ 13:31)  RR: 17 (12-13-21 @ 13:31)  SpO2: 100% (12-13-21 @ 13:31)  Wt(kg): --      GEN: NAD, normocephalic  CVS: S1S2+  CHEST: clear to auscultation  ABD: soft , obese, nontender, nondistended, no rebound, no guarding  EXTR: no cyanosis, no clubbing, no edema  NEURO: Awake and alert; non-focal exam   SKIN:  warm;  non icteric    ______________________________________________________________________  LABS:                        15.4   15.09 )-----------( 237      ( 13 Dec 2021 08:37 )             45.8     12-13    140  |  104  |  25<H>  ----------------------------<  166<H>  4.1   |  27  |  1.25    Ca    10.2      13 Dec 2021 08:37    TPro  8.9<H>  /  Alb  3.8  /  TBili  0.9  /  DBili  x   /  AST  31  /  ALT  30  /  AlkPhos  61  12-13    LIVER FUNCTIONS - ( 13 Dec 2021 08:37 )  Alb: 3.8 g/dL / Pro: 8.9 g/dL / ALK PHOS: 61 U/L / ALT: 30 U/L DA / AST: 31 U/L / GGT: x                       [facility]    Patient is a 66y old  Female who presents with a chief complaint of small bowel obstruction (13 Dec 2021 11:40)    HPI:  66 year old female with PMH HTN, HLD, anxiety and bowel obstruction x 3 over the past 10 years with no PSH presents to the ED complaining of diffuse abdominal pain which has improved, nausea and vomiting x1. Last BM or flatus was Saturday. History of gastric ulcer in the past, but unsure how long ago. Denies GI workup with colonoscopy or capsule endoscopy despite prior recommendations. Denies weight loss, constipation, diarrhea between episodes, chest pain, shortness of breath, fever, chills, dysuria.  (13 Dec 2021 11:40)    PMH/PSH:  PAST MEDICAL & SURGICAL HISTORY:  Anxiety    HTN (hypertension)    Herpes simplex    Hypercholesteremia    Heart murmur    UTI (urinary tract infection)    SBO (small bowel obstruction)    Gastritis    HLD (hyperlipidemia)    No significant past surgical history      FH:  FAMILY HISTORY:      MEDS:  MEDICATIONS  (STANDING):  enalaprilat Injectable 1.25 milliGRAM(s) IV Push every 6 hours  heparin   Injectable 5000 Unit(s) SubCutaneous every 8 hours  metoprolol tartrate Injectable 2.5 milliGRAM(s) IV Push every 6 hours  sodium chloride 0.9%. 1000 milliLiter(s) (125 mL/Hr) IV Continuous <Continuous>    MEDICATIONS  (PRN):  ondansetron Injectable 4 milliGRAM(s) IV Push every 6 hours PRN Nausea and/or Vomiting    Allergies    No Known Allergies    Intolerances            CONSTITUTIONAL:  No weight loss, fever, chills, weakness or fatigue.  HEENT:  Eyes:  No visual loss, blurred vision, double vision or yellow sclerae. Ears, Nose, Throat:  No hearing loss, sneezing, congestion, runny nose or sore throat.  SKIN:  No rash or itching.  CARDIOVASCULAR:  No chest pain, chest pressure or chest discomfort. No palpitations or edema.  RESPIRATORY:  No shortness of breath, cough or sputum.  GASTROINTESTINAL:  SEE HPI  GENITOURINARY:  No dysuria, hematuria, urinary frequency  NEUROLOGICAL:  No headache, dizziness, syncope, paralysis, ataxia, numbness or tingling in the extremities. No change in bowel or bladder control.  MUSCULOSKELETAL:  No muscle, back pain, joint pain or stiffness.  HEMATOLOGIC:  No anemia, bleeding or bruising.  LYMPHATICS:  No enlarged nodes. No history of splenectomy.  PSYCHIATRIC:  No history of depression or anxiety.  ENDOCRINOLOGIC:  No reports of sweating, cold or heat intolerance. No polyuria or polydipsia.    GI HPI: Patient seen and examined. States abdominal pain improving. Denies weight loss, constipation, or diarrhea. No chest pain, shortness of breath, fever, chills, dysuria. No hematemesis, hematochezia or melena. Patient last BM on Saturday which she describes as semi soft yellowish stool. Patient denies ever having an colonoscopy but states she had a normal sigmoidoscopy maybe 10 years ago. Patient denies family or personal hx of Crohn's disease.        ______________________________________________________________________  PHYSICAL EXAM:  T(C): 37.5 (12-13-21 @ 07:00), Max: 37.5 (12-13-21 @ 07:00)  HR: 91 (12-13-21 @ 13:31)  BP: 105/75 (12-13-21 @ 13:31)  RR: 17 (12-13-21 @ 13:31)  SpO2: 100% (12-13-21 @ 13:31)  Wt(kg): --      GEN: NAD, normocephalic  CVS: S1S2+  CHEST: clear to auscultation  ABD: soft , obese, nontender, nondistended, no rebound, no guarding  EXTR: no cyanosis, no clubbing, no edema  NEURO: Awake and alert; non-focal exam   SKIN:  warm;  non icteric    ______________________________________________________________________  LABS:                        15.4   15.09 )-----------( 237      ( 13 Dec 2021 08:37 )             45.8     12-13    140  |  104  |  25<H>  ----------------------------<  166<H>  4.1   |  27  |  1.25    Ca    10.2      13 Dec 2021 08:37    TPro  8.9<H>  /  Alb  3.8  /  TBili  0.9  /  DBili  x   /  AST  31  /  ALT  30  /  AlkPhos  61  12-13    LIVER FUNCTIONS - ( 13 Dec 2021 08:37 )  Alb: 3.8 g/dL / Pro: 8.9 g/dL / ALK PHOS: 61 U/L / ALT: 30 U/L DA / AST: 31 U/L / GGT: x

## 2021-12-13 NOTE — CHART NOTE - NSCHARTNOTEFT_GEN_A_CORE
Attempted to place NGT at bedside due to small bowel obstruction.  Attempt x3 but patient was unable to tolerate and continued to pull out NGT.   Keep NPO on IVF. Will reassess later if patient more able to tolerate.

## 2021-12-13 NOTE — ED PROVIDER NOTE - OBJECTIVE STATEMENT
Patient reports intermittent, frequent, diffuse abdominal pain since 12/10. Comes and goes every few minutes, with bitter taste in her mouth. Vomited this morning after taking Pepto-Bismol. Normal BM on 12/11. No fever, cp, sob, diarrhea, dysuria, urgency, frequency.

## 2021-12-14 LAB
ANION GAP SERPL CALC-SCNC: 9 MMOL/L — SIGNIFICANT CHANGE UP (ref 5–17)
APTT BLD: 36.1 SEC — HIGH (ref 27.5–35.5)
BUN SERPL-MCNC: 21 MG/DL — HIGH (ref 7–18)
CALCIUM SERPL-MCNC: 8.5 MG/DL — SIGNIFICANT CHANGE UP (ref 8.4–10.5)
CHLORIDE SERPL-SCNC: 114 MMOL/L — HIGH (ref 96–108)
CO2 SERPL-SCNC: 23 MMOL/L — SIGNIFICANT CHANGE UP (ref 22–31)
CREAT SERPL-MCNC: 0.93 MG/DL — SIGNIFICANT CHANGE UP (ref 0.5–1.3)
GLUCOSE SERPL-MCNC: 110 MG/DL — HIGH (ref 70–99)
INR BLD: 1.23 RATIO — HIGH (ref 0.88–1.16)
POTASSIUM SERPL-MCNC: 3.4 MMOL/L — LOW (ref 3.5–5.3)
POTASSIUM SERPL-SCNC: 3.4 MMOL/L — LOW (ref 3.5–5.3)
PROTHROM AB SERPL-ACNC: 14.5 SEC — HIGH (ref 10.6–13.6)
SODIUM SERPL-SCNC: 146 MMOL/L — HIGH (ref 135–145)

## 2021-12-14 PROCEDURE — 99232 SBSQ HOSP IP/OBS MODERATE 35: CPT

## 2021-12-14 PROCEDURE — 74250 X-RAY XM SM INT 1CNTRST STD: CPT | Mod: 26

## 2021-12-14 RX ORDER — DEXTROSE MONOHYDRATE, SODIUM CHLORIDE, AND POTASSIUM CHLORIDE 50; .745; 4.5 G/1000ML; G/1000ML; G/1000ML
1000 INJECTION, SOLUTION INTRAVENOUS
Refills: 0 | Status: DISCONTINUED | OUTPATIENT
Start: 2021-12-14 | End: 2021-12-15

## 2021-12-14 RX ORDER — POTASSIUM CHLORIDE 20 MEQ
10 PACKET (EA) ORAL
Refills: 0 | Status: COMPLETED | OUTPATIENT
Start: 2021-12-14 | End: 2021-12-14

## 2021-12-14 RX ADMIN — Medication 2.5 MILLIGRAM(S): at 12:54

## 2021-12-14 RX ADMIN — Medication 1.25 MILLIGRAM(S): at 05:58

## 2021-12-14 RX ADMIN — HEPARIN SODIUM 5000 UNIT(S): 5000 INJECTION INTRAVENOUS; SUBCUTANEOUS at 05:59

## 2021-12-14 RX ADMIN — Medication 100 MILLIEQUIVALENT(S): at 12:16

## 2021-12-14 RX ADMIN — Medication 2.5 MILLIGRAM(S): at 06:00

## 2021-12-14 RX ADMIN — HEPARIN SODIUM 5000 UNIT(S): 5000 INJECTION INTRAVENOUS; SUBCUTANEOUS at 21:22

## 2021-12-14 RX ADMIN — HEPARIN SODIUM 5000 UNIT(S): 5000 INJECTION INTRAVENOUS; SUBCUTANEOUS at 15:00

## 2021-12-14 RX ADMIN — SODIUM CHLORIDE 125 MILLILITER(S): 9 INJECTION INTRAMUSCULAR; INTRAVENOUS; SUBCUTANEOUS at 23:25

## 2021-12-14 RX ADMIN — Medication 100 MILLIEQUIVALENT(S): at 11:33

## 2021-12-14 RX ADMIN — Medication 100 MILLIEQUIVALENT(S): at 10:37

## 2021-12-14 RX ADMIN — Medication 1.25 MILLIGRAM(S): at 12:54

## 2021-12-14 NOTE — MEDICAL STUDENT PROGRESS NOTE(EDUCATION) - NS MD HP STUD ASPLAN ASSES FT
66 y.o female with PMH HTN, HLD, and bowel obstruction x 3 with no PSH with small bowel obstruction. Afebrile, hypotensive, hypokalemic K 3.4

## 2021-12-15 LAB
ALBUMIN SERPL ELPH-MCNC: 2.8 G/DL — LOW (ref 3.5–5)
ALP SERPL-CCNC: 40 U/L — SIGNIFICANT CHANGE UP (ref 40–120)
ALT FLD-CCNC: 18 U/L DA — SIGNIFICANT CHANGE UP (ref 10–60)
ANION GAP SERPL CALC-SCNC: 5 MMOL/L — SIGNIFICANT CHANGE UP (ref 5–17)
ANION GAP SERPL CALC-SCNC: 5 MMOL/L — SIGNIFICANT CHANGE UP (ref 5–17)
APPEARANCE UR: CLEAR — SIGNIFICANT CHANGE UP
AST SERPL-CCNC: 15 U/L — SIGNIFICANT CHANGE UP (ref 10–40)
BILIRUB SERPL-MCNC: 0.5 MG/DL — SIGNIFICANT CHANGE UP (ref 0.2–1.2)
BILIRUB UR-MCNC: NEGATIVE — SIGNIFICANT CHANGE UP
BUN SERPL-MCNC: 18 MG/DL — SIGNIFICANT CHANGE UP (ref 7–18)
BUN SERPL-MCNC: 9 MG/DL — SIGNIFICANT CHANGE UP (ref 7–18)
CALCIUM SERPL-MCNC: 8.2 MG/DL — LOW (ref 8.4–10.5)
CALCIUM SERPL-MCNC: 8.2 MG/DL — LOW (ref 8.4–10.5)
CHLORIDE SERPL-SCNC: 113 MMOL/L — HIGH (ref 96–108)
CHLORIDE SERPL-SCNC: 120 MMOL/L — HIGH (ref 96–108)
CO2 SERPL-SCNC: 26 MMOL/L — SIGNIFICANT CHANGE UP (ref 22–31)
CO2 SERPL-SCNC: 27 MMOL/L — SIGNIFICANT CHANGE UP (ref 22–31)
COLOR SPEC: YELLOW — SIGNIFICANT CHANGE UP
CREAT SERPL-MCNC: 0.8 MG/DL — SIGNIFICANT CHANGE UP (ref 0.5–1.3)
CREAT SERPL-MCNC: 0.94 MG/DL — SIGNIFICANT CHANGE UP (ref 0.5–1.3)
DIFF PNL FLD: ABNORMAL
GLUCOSE SERPL-MCNC: 119 MG/DL — HIGH (ref 70–99)
GLUCOSE SERPL-MCNC: 122 MG/DL — HIGH (ref 70–99)
GLUCOSE UR QL: NEGATIVE — SIGNIFICANT CHANGE UP
HCT VFR BLD CALC: 32.5 % — LOW (ref 34.5–45)
HCT VFR BLD CALC: 34.8 % — SIGNIFICANT CHANGE UP (ref 34.5–45)
HGB BLD-MCNC: 11.1 G/DL — LOW (ref 11.5–15.5)
HGB BLD-MCNC: 11.1 G/DL — LOW (ref 11.5–15.5)
KETONES UR-MCNC: NEGATIVE — SIGNIFICANT CHANGE UP
LACTATE SERPL-SCNC: 1 MMOL/L — SIGNIFICANT CHANGE UP (ref 0.7–2)
LEUKOCYTE ESTERASE UR-ACNC: NEGATIVE — SIGNIFICANT CHANGE UP
MAGNESIUM SERPL-MCNC: 2.1 MG/DL — SIGNIFICANT CHANGE UP (ref 1.6–2.6)
MCHC RBC-ENTMCNC: 28 PG — SIGNIFICANT CHANGE UP (ref 27–34)
MCHC RBC-ENTMCNC: 29.4 PG — SIGNIFICANT CHANGE UP (ref 27–34)
MCHC RBC-ENTMCNC: 31.9 GM/DL — LOW (ref 32–36)
MCHC RBC-ENTMCNC: 34.2 GM/DL — SIGNIFICANT CHANGE UP (ref 32–36)
MCV RBC AUTO: 86 FL — SIGNIFICANT CHANGE UP (ref 80–100)
MCV RBC AUTO: 87.9 FL — SIGNIFICANT CHANGE UP (ref 80–100)
NITRITE UR-MCNC: NEGATIVE — SIGNIFICANT CHANGE UP
NRBC # BLD: 0 /100 WBCS — SIGNIFICANT CHANGE UP (ref 0–0)
NRBC # BLD: 0 /100 WBCS — SIGNIFICANT CHANGE UP (ref 0–0)
PH UR: 5 — SIGNIFICANT CHANGE UP (ref 5–8)
PHOSPHATE SERPL-MCNC: 2.3 MG/DL — LOW (ref 2.5–4.5)
PLATELET # BLD AUTO: 144 K/UL — LOW (ref 150–400)
PLATELET # BLD AUTO: 160 K/UL — SIGNIFICANT CHANGE UP (ref 150–400)
POTASSIUM SERPL-MCNC: 3.7 MMOL/L — SIGNIFICANT CHANGE UP (ref 3.5–5.3)
POTASSIUM SERPL-MCNC: 4.2 MMOL/L — SIGNIFICANT CHANGE UP (ref 3.5–5.3)
POTASSIUM SERPL-SCNC: 3.7 MMOL/L — SIGNIFICANT CHANGE UP (ref 3.5–5.3)
POTASSIUM SERPL-SCNC: 4.2 MMOL/L — SIGNIFICANT CHANGE UP (ref 3.5–5.3)
PROT SERPL-MCNC: 6.4 G/DL — SIGNIFICANT CHANGE UP (ref 6–8.3)
PROT UR-MCNC: 15
RAPID RVP RESULT: SIGNIFICANT CHANGE UP
RBC # BLD: 3.78 M/UL — LOW (ref 3.8–5.2)
RBC # BLD: 3.96 M/UL — SIGNIFICANT CHANGE UP (ref 3.8–5.2)
RBC # FLD: 13 % — SIGNIFICANT CHANGE UP (ref 10.3–14.5)
RBC # FLD: 13.2 % — SIGNIFICANT CHANGE UP (ref 10.3–14.5)
SARS-COV-2 RNA SPEC QL NAA+PROBE: SIGNIFICANT CHANGE UP
SODIUM SERPL-SCNC: 145 MMOL/L — SIGNIFICANT CHANGE UP (ref 135–145)
SODIUM SERPL-SCNC: 151 MMOL/L — HIGH (ref 135–145)
SP GR SPEC: 1.01 — SIGNIFICANT CHANGE UP (ref 1.01–1.02)
UROBILINOGEN FLD QL: NEGATIVE — SIGNIFICANT CHANGE UP
WBC # BLD: 4.64 K/UL — SIGNIFICANT CHANGE UP (ref 3.8–10.5)
WBC # BLD: 4.83 K/UL — SIGNIFICANT CHANGE UP (ref 3.8–10.5)
WBC # FLD AUTO: 4.64 K/UL — SIGNIFICANT CHANGE UP (ref 3.8–10.5)
WBC # FLD AUTO: 4.83 K/UL — SIGNIFICANT CHANGE UP (ref 3.8–10.5)

## 2021-12-15 PROCEDURE — 71045 X-RAY EXAM CHEST 1 VIEW: CPT | Mod: 26

## 2021-12-15 PROCEDURE — 99232 SBSQ HOSP IP/OBS MODERATE 35: CPT

## 2021-12-15 RX ORDER — SODIUM CHLORIDE 9 MG/ML
1000 INJECTION, SOLUTION INTRAVENOUS
Refills: 0 | Status: DISCONTINUED | OUTPATIENT
Start: 2021-12-15 | End: 2021-12-16

## 2021-12-15 RX ORDER — SERTRALINE 25 MG/1
50 TABLET, FILM COATED ORAL DAILY
Refills: 0 | Status: DISCONTINUED | OUTPATIENT
Start: 2021-12-15 | End: 2021-12-18

## 2021-12-15 RX ORDER — METOPROLOL TARTRATE 50 MG
25 TABLET ORAL DAILY
Refills: 0 | Status: DISCONTINUED | OUTPATIENT
Start: 2021-12-15 | End: 2021-12-18

## 2021-12-15 RX ORDER — LISINOPRIL 2.5 MG/1
20 TABLET ORAL DAILY
Refills: 0 | Status: DISCONTINUED | OUTPATIENT
Start: 2021-12-15 | End: 2021-12-18

## 2021-12-15 RX ORDER — HYDROCHLOROTHIAZIDE 25 MG
12.5 TABLET ORAL DAILY
Refills: 0 | Status: DISCONTINUED | OUTPATIENT
Start: 2021-12-15 | End: 2021-12-18

## 2021-12-15 RX ORDER — ACETAMINOPHEN 500 MG
650 TABLET ORAL ONCE
Refills: 0 | Status: COMPLETED | OUTPATIENT
Start: 2021-12-15 | End: 2021-12-15

## 2021-12-15 RX ORDER — ACETAMINOPHEN 500 MG
1000 TABLET ORAL ONCE
Refills: 0 | Status: COMPLETED | OUTPATIENT
Start: 2021-12-15 | End: 2021-12-15

## 2021-12-15 RX ORDER — DIPHENHYDRAMINE HCL 50 MG
25 CAPSULE ORAL ONCE
Refills: 0 | Status: COMPLETED | OUTPATIENT
Start: 2021-12-15 | End: 2021-12-15

## 2021-12-15 RX ADMIN — Medication 25 MILLIGRAM(S): at 00:57

## 2021-12-15 RX ADMIN — Medication 650 MILLIGRAM(S): at 17:58

## 2021-12-15 RX ADMIN — HEPARIN SODIUM 5000 UNIT(S): 5000 INJECTION INTRAVENOUS; SUBCUTANEOUS at 05:49

## 2021-12-15 RX ADMIN — SERTRALINE 50 MILLIGRAM(S): 25 TABLET, FILM COATED ORAL at 11:28

## 2021-12-15 RX ADMIN — DEXTROSE MONOHYDRATE, SODIUM CHLORIDE, AND POTASSIUM CHLORIDE 125 MILLILITER(S): 50; .745; 4.5 INJECTION, SOLUTION INTRAVENOUS at 00:57

## 2021-12-15 RX ADMIN — HEPARIN SODIUM 5000 UNIT(S): 5000 INJECTION INTRAVENOUS; SUBCUTANEOUS at 21:12

## 2021-12-15 RX ADMIN — HEPARIN SODIUM 5000 UNIT(S): 5000 INJECTION INTRAVENOUS; SUBCUTANEOUS at 14:14

## 2021-12-15 RX ADMIN — Medication 400 MILLIGRAM(S): at 23:28

## 2021-12-15 NOTE — CHART NOTE - NSCHARTNOTEFT_GEN_A_CORE
Called by RN that patient complained of chills and was found to have a fev er of 101.4.   Patient seen and examined at bedside for evaluation. Patient complaining only of chills. Admits to throat discomfort from recent NGT. Denies chest pain, shortness of breath, cough, stuffy nose, sinus pressure, dysuria, difficulty urinating, abdominal pain.   Abdomen soft nontender, no sinus tenderness to palpation.     66 year old female with new fever to 101.4. Admitted with small bowel obstruction which is now resolved.  - discussed with Dr. Lezama   - will do fever work up including blood and urine cultures, UA, chest xray, respiratory viral panel  - Tylenol x1 for fever  - monitor for symptoms

## 2021-12-15 NOTE — PROGRESS NOTE ADULT - PROBLEM SELECTOR PLAN 1
resolving recurrent SBO  f/u stool study  patient refusing inpatient colonoscopy resolving recurrent SBO  f/u stool study  monitor serum sodium  patient refusing inpatient colonoscopy

## 2021-12-16 ENCOUNTER — TRANSCRIPTION ENCOUNTER (OUTPATIENT)
Age: 66
End: 2021-12-16

## 2021-12-16 DIAGNOSIS — K86.9 DISEASE OF PANCREAS, UNSPECIFIED: ICD-10-CM

## 2021-12-16 LAB
ANION GAP SERPL CALC-SCNC: 4 MMOL/L — LOW (ref 5–17)
BUN SERPL-MCNC: 9 MG/DL — SIGNIFICANT CHANGE UP (ref 7–18)
CALCIUM SERPL-MCNC: 8 MG/DL — LOW (ref 8.4–10.5)
CANCER AG125 SERPL-ACNC: 16 U/ML — SIGNIFICANT CHANGE UP
CANCER AG19-9 SERPL-ACNC: 18 U/ML — SIGNIFICANT CHANGE UP
CEA SERPL-MCNC: 3.9 NG/ML — HIGH (ref 0–3.8)
CHLORIDE SERPL-SCNC: 113 MMOL/L — HIGH (ref 96–108)
CO2 SERPL-SCNC: 26 MMOL/L — SIGNIFICANT CHANGE UP (ref 22–31)
CREAT SERPL-MCNC: 0.86 MG/DL — SIGNIFICANT CHANGE UP (ref 0.5–1.3)
GLUCOSE SERPL-MCNC: 96 MG/DL — SIGNIFICANT CHANGE UP (ref 70–99)
HCT VFR BLD CALC: 31.1 % — LOW (ref 34.5–45)
HGB BLD-MCNC: 10.5 G/DL — LOW (ref 11.5–15.5)
MCHC RBC-ENTMCNC: 29.2 PG — SIGNIFICANT CHANGE UP (ref 27–34)
MCHC RBC-ENTMCNC: 33.8 GM/DL — SIGNIFICANT CHANGE UP (ref 32–36)
MCV RBC AUTO: 86.6 FL — SIGNIFICANT CHANGE UP (ref 80–100)
NRBC # BLD: 0 /100 WBCS — SIGNIFICANT CHANGE UP (ref 0–0)
PLATELET # BLD AUTO: 155 K/UL — SIGNIFICANT CHANGE UP (ref 150–400)
POTASSIUM SERPL-MCNC: 3.4 MMOL/L — LOW (ref 3.5–5.3)
POTASSIUM SERPL-SCNC: 3.4 MMOL/L — LOW (ref 3.5–5.3)
RBC # BLD: 3.59 M/UL — LOW (ref 3.8–5.2)
RBC # FLD: 13.2 % — SIGNIFICANT CHANGE UP (ref 10.3–14.5)
SODIUM SERPL-SCNC: 143 MMOL/L — SIGNIFICANT CHANGE UP (ref 135–145)
WBC # BLD: 5.89 K/UL — SIGNIFICANT CHANGE UP (ref 3.8–10.5)
WBC # FLD AUTO: 5.89 K/UL — SIGNIFICANT CHANGE UP (ref 3.8–10.5)

## 2021-12-16 PROCEDURE — 99232 SBSQ HOSP IP/OBS MODERATE 35: CPT

## 2021-12-16 RX ORDER — SODIUM CHLORIDE 9 MG/ML
500 INJECTION, SOLUTION INTRAVENOUS ONCE
Refills: 0 | Status: COMPLETED | OUTPATIENT
Start: 2021-12-16 | End: 2021-12-16

## 2021-12-16 RX ORDER — POTASSIUM CHLORIDE 20 MEQ
10 PACKET (EA) ORAL
Refills: 0 | Status: COMPLETED | OUTPATIENT
Start: 2021-12-16 | End: 2021-12-16

## 2021-12-16 RX ADMIN — Medication 100 MILLIEQUIVALENT(S): at 09:15

## 2021-12-16 RX ADMIN — HEPARIN SODIUM 5000 UNIT(S): 5000 INJECTION INTRAVENOUS; SUBCUTANEOUS at 13:10

## 2021-12-16 RX ADMIN — Medication 1000 MILLIGRAM(S): at 00:12

## 2021-12-16 RX ADMIN — Medication 100 MILLIEQUIVALENT(S): at 08:08

## 2021-12-16 RX ADMIN — Medication 100 MILLIEQUIVALENT(S): at 10:46

## 2021-12-16 RX ADMIN — SODIUM CHLORIDE 500 MILLILITER(S): 9 INJECTION, SOLUTION INTRAVENOUS at 05:56

## 2021-12-16 RX ADMIN — HEPARIN SODIUM 5000 UNIT(S): 5000 INJECTION INTRAVENOUS; SUBCUTANEOUS at 05:29

## 2021-12-16 RX ADMIN — SERTRALINE 50 MILLIGRAM(S): 25 TABLET, FILM COATED ORAL at 11:20

## 2021-12-16 RX ADMIN — HEPARIN SODIUM 5000 UNIT(S): 5000 INJECTION INTRAVENOUS; SUBCUTANEOUS at 21:46

## 2021-12-16 NOTE — DISCHARGE NOTE PROVIDER - NSDCMRMEDTOKEN_GEN_ALL_CORE_FT
fatigue
Crestor 5 mg oral tablet: 1 tab(s) orally once a day (at bedtime)  hydrochlorothiazide-lisinopril 12.5 mg-20 mg oral tablet: 1 tab(s) orally once a day  metoprolol succinate 25 mg oral tablet, extended release: 1 tab(s) orally once a day  sertraline 50 mg oral tablet: 1 tab(s) orally once a day

## 2021-12-16 NOTE — DISCHARGE NOTE PROVIDER - NSDCCPCAREPLAN_GEN_ALL_CORE_FT
PRINCIPAL DISCHARGE DIAGNOSIS  Diagnosis: Small bowel obstruction  Assessment and Plan of Treatment: - low residue diet as tolerated  - take stool softener as needed  - encourage fluid intake  - follow up with gastroenterologist for continued work up  - follow up with your PCP for routine care  - will need repeat CT/MRI for pancreatic cyst and splenic lesions within few months  - follow up with surgeon within 2 weeks as routine check up

## 2021-12-16 NOTE — MEDICAL STUDENT PROGRESS NOTE(EDUCATION) - SUBJECTIVE AND OBJECTIVE BOX
66 y.o female with PMH HTN, HLD, and bowel obstruction x 3 with no PSH with small bowel obstruction day 4. The patient presented to ER with abdominal pain associated with nausea and vomiting, the patient states she didn't pass gas and no BM since Saturday. CT reveals small bowel obstruction with transition point in the distal ileum.  The patient had fever last night with max 102F,and also diarrhea.  Today, the patient seen and examine at bedside. Pt is resting comfortably ambulated tolerated clear diet.  pt denied any abdominal pain, N&V, chest pain, SOB, cough, or dysuria.     Vital Signs Last 24 Hrs  T(C): 37.5 (16 Dec 2021 12:51), Max: 38.9 (15 Dec 2021 22:53)  T(F): 99.5 (16 Dec 2021 12:51), Max: 102 (15 Dec 2021 22:53)  HR: 69 (16 Dec 2021 12:51) (69 - 105)  BP: 116/72 (16 Dec 2021 12:51) (97/45 - 126/61)  BP(mean): 63 (16 Dec 2021 06:45) (57 - 63)  RR: 16 (16 Dec 2021 12:51) (15 - 18)  SpO2: 98% (16 Dec 2021 12:51) (98% - 100%)                            10.5   5.89  )-----------( 155      ( 16 Dec 2021 06:12 )             31.1   12-16    143  |  113<H>  |  9   ----------------------------<  96  3.4<L>   |  26  |  0.86    Ca    8.0<L>      16 Dec 2021 06:12  Phos  2.3     12-15  Mg     2.1     12-15    TPro  6.4  /  Alb  2.8<L>  /  TBili  0.5  /  DBili  x   /  AST  15  /  ALT  18  /  AlkPhos  40  12-15      MEDICATIONS  (STANDING):  heparin   Injectable 5000 Unit(s) SubCutaneous every 8 hours  hydrochlorothiazide 12.5 milliGRAM(s) Oral daily  lisinopril 20 milliGRAM(s) Oral daily  metoprolol succinate ER 25 milliGRAM(s) Oral daily  sertraline 50 milliGRAM(s) Oral daily    MEDICATIONS  (PRN):  benzocaine 15 mG/menthol 3.6 mG (Sugar-Free) Lozenge 1 Lozenge Oral every 4 hours PRN Sore Throat  ondansetron Injectable 4 milliGRAM(s) IV Push every 6 hours PRN Nausea and/or Vomiting      Physical Exam:    General: Well appearing, AOx3, and no acute distress.  Lung: Breath sounds clear and equal bilaterally. No respiratory distress, wheeze or rhonchi  Heart: Normal rate, regular rhythm.  Heart sounds S1, S2.  No murmurs, rubs or gallops.  Abdominal: Abdomen soft, non-tender, non-distended. Normal bowel sounds. No guarding or rebound.  EXT: No edema, tenderness or swelling

## 2021-12-16 NOTE — MEDICAL STUDENT PROGRESS NOTE(EDUCATION) - NS MD HP STUD ASPLAN ASSES FT
66 y.o female with PMH HTN, HLD, and bowel obstruction x 3 with no PSH with small bowel obstruction. febrile overnight, tachycardic, hypotensive, hypokalemic K 3.9 66 y.o female with PMH HTN, HLD, and bowel obstruction x 3 with no PSH with small bowel obstruction. febrile overnight, tachycardic, hypotensive, hypokalemic K 3.4

## 2021-12-16 NOTE — DISCHARGE NOTE PROVIDER - HOSPITAL COURSE
66 year old female with PMH HTN, HLD, anxiety and bowel obstruction x 3 over the past 10 years with no PSH presents to the ED complaining of diffuse abdominal pain which has improved, nausea and vomiting x1. Last BM or flatus was Saturday. History of gastric ulcer in the past, but unsure how long ago. Denies GI workup with colonoscopy or capsule endoscopy despite prior recommendations. Denies weight loss, constipation, diarrhea between episodes, chest pain, shortness of breath, fever, chills, dysuria.     Work up including CT a/p revealed small bowel obstruction with transition point in the distal ileum. Also, increased size of cystic lesion in the pancreatic neck, measuring 3.0 cm,   previously 2.0 cm. NGT placed for decompression and pt admitted for further conservative management. GI was consulted. Over the course, pt recovered bowel function, NGT removed and diet advanced appropriately, in which was well tolerated. GI recommended inpatient colonoscopy to r/o IBD, pt refused. Pt had an incident of fever, work ups including UA, CXR, RVP and COVID was negative.     ***incomplete***       66 year old female with PMH HTN, HLD, anxiety and bowel obstruction x 3 over the past 10 years with no PSH presents to the ED complaining of diffuse abdominal pain which has improved, nausea and vomiting x1. Last BM or flatus was Saturday. History of gastric ulcer in the past, but unsure how long ago. Denies GI workup with colonoscopy or capsule endoscopy despite prior recommendations. Denies weight loss, constipation, diarrhea between episodes, chest pain, shortness of breath, fever, chills, dysuria.     Work-up including CT a/p revealed small bowel obstruction with transition point in the distal ileum. Also, increased size of cystic lesion in the pancreatic neck, measuring 3.0 cm,   previously 2.0 cm. NGT placed for decompression and pt admitted for further conservative management. GI was consulted. Over the course, pt recovered bowel function, NGT removed and diet advanced appropriately, in which was well tolerated. GI recommended inpatient colonoscopy to r/o IBD, pt refused. Pt had an incident of fever, work ups including UA, CXR, RVP and COVID was negative. Pt clinically improved, tolerated diet and was afebrile. Pt was stable for discharge with appropriate follow-up in the office with GI and surgeon.

## 2021-12-16 NOTE — PROGRESS NOTE ADULT - PROBLEM SELECTOR PLAN 1
f/u stool caloprotectin   serum sodium within normal  patient refusing inpatient colonoscopy  outpatient colonoscopy to r/o IBD

## 2021-12-16 NOTE — DISCHARGE NOTE PROVIDER - CARE PROVIDER_API CALL
Helio Lezama  General Surgery  [id number] [address] [place] [address]  Phone: [phone]  Fax: [phone]  Follow Up Time: 2 weeks    Larry Crockett)  Gastroenterology  [address], [address] [place] [address], [facility] [place] [address]  Phone: [phone]  Fax: [phone]  Follow Up Time: 2 weeks

## 2021-12-16 NOTE — DISCHARGE NOTE PROVIDER - PROVIDER TOKENS
PROVIDER:[TOKEN:[[id number]],FOLLOWUP:[2 weeks]],PROVIDER:[TOKEN:[[id number]],FOLLOWUP:[2 weeks]]

## 2021-12-17 LAB
ALBUMIN SERPL ELPH-MCNC: 2.9 G/DL — LOW (ref 3.5–5)
ALP SERPL-CCNC: 36 U/L — LOW (ref 40–120)
ALT FLD-CCNC: 36 U/L DA — SIGNIFICANT CHANGE UP (ref 10–60)
ANION GAP SERPL CALC-SCNC: 6 MMOL/L — SIGNIFICANT CHANGE UP (ref 5–17)
AST SERPL-CCNC: 25 U/L — SIGNIFICANT CHANGE UP (ref 10–40)
BILIRUB SERPL-MCNC: 0.4 MG/DL — SIGNIFICANT CHANGE UP (ref 0.2–1.2)
BUN SERPL-MCNC: 8 MG/DL — SIGNIFICANT CHANGE UP (ref 7–18)
CALCIUM SERPL-MCNC: 8.6 MG/DL — SIGNIFICANT CHANGE UP (ref 8.4–10.5)
CHLORIDE SERPL-SCNC: 113 MMOL/L — HIGH (ref 96–108)
CO2 SERPL-SCNC: 24 MMOL/L — SIGNIFICANT CHANGE UP (ref 22–31)
CREAT SERPL-MCNC: 0.8 MG/DL — SIGNIFICANT CHANGE UP (ref 0.5–1.3)
CULTURE RESULTS: SIGNIFICANT CHANGE UP
GLUCOSE SERPL-MCNC: 84 MG/DL — SIGNIFICANT CHANGE UP (ref 70–99)
HCT VFR BLD CALC: 32.8 % — LOW (ref 34.5–45)
HGB BLD-MCNC: 10.8 G/DL — LOW (ref 11.5–15.5)
MAGNESIUM SERPL-MCNC: 1.7 MG/DL — SIGNIFICANT CHANGE UP (ref 1.6–2.6)
MCHC RBC-ENTMCNC: 28.1 PG — SIGNIFICANT CHANGE UP (ref 27–34)
MCHC RBC-ENTMCNC: 32.9 GM/DL — SIGNIFICANT CHANGE UP (ref 32–36)
MCV RBC AUTO: 85.4 FL — SIGNIFICANT CHANGE UP (ref 80–100)
NRBC # BLD: 0 /100 WBCS — SIGNIFICANT CHANGE UP (ref 0–0)
PHOSPHATE SERPL-MCNC: 2.3 MG/DL — LOW (ref 2.5–4.5)
PLATELET # BLD AUTO: 156 K/UL — SIGNIFICANT CHANGE UP (ref 150–400)
POTASSIUM SERPL-MCNC: 3.9 MMOL/L — SIGNIFICANT CHANGE UP (ref 3.5–5.3)
POTASSIUM SERPL-SCNC: 3.9 MMOL/L — SIGNIFICANT CHANGE UP (ref 3.5–5.3)
PROT SERPL-MCNC: 6 G/DL — SIGNIFICANT CHANGE UP (ref 6–8.3)
RBC # BLD: 3.84 M/UL — SIGNIFICANT CHANGE UP (ref 3.8–5.2)
RBC # FLD: 13 % — SIGNIFICANT CHANGE UP (ref 10.3–14.5)
SODIUM SERPL-SCNC: 143 MMOL/L — SIGNIFICANT CHANGE UP (ref 135–145)
SPECIMEN SOURCE: SIGNIFICANT CHANGE UP
WBC # BLD: 4.83 K/UL — SIGNIFICANT CHANGE UP (ref 3.8–10.5)
WBC # FLD AUTO: 4.83 K/UL — SIGNIFICANT CHANGE UP (ref 3.8–10.5)

## 2021-12-17 PROCEDURE — 99232 SBSQ HOSP IP/OBS MODERATE 35: CPT

## 2021-12-17 RX ORDER — POTASSIUM PHOSPHATE, MONOBASIC POTASSIUM PHOSPHATE, DIBASIC 236; 224 MG/ML; MG/ML
15 INJECTION, SOLUTION INTRAVENOUS ONCE
Refills: 0 | Status: COMPLETED | OUTPATIENT
Start: 2021-12-17 | End: 2021-12-17

## 2021-12-17 RX ADMIN — POTASSIUM PHOSPHATE, MONOBASIC POTASSIUM PHOSPHATE, DIBASIC 62.5 MILLIMOLE(S): 236; 224 INJECTION, SOLUTION INTRAVENOUS at 09:32

## 2021-12-17 RX ADMIN — LISINOPRIL 20 MILLIGRAM(S): 2.5 TABLET ORAL at 06:20

## 2021-12-17 RX ADMIN — HEPARIN SODIUM 5000 UNIT(S): 5000 INJECTION INTRAVENOUS; SUBCUTANEOUS at 22:12

## 2021-12-17 RX ADMIN — Medication 12.5 MILLIGRAM(S): at 06:20

## 2021-12-17 RX ADMIN — SERTRALINE 50 MILLIGRAM(S): 25 TABLET, FILM COATED ORAL at 11:09

## 2021-12-17 RX ADMIN — HEPARIN SODIUM 5000 UNIT(S): 5000 INJECTION INTRAVENOUS; SUBCUTANEOUS at 06:20

## 2021-12-17 RX ADMIN — Medication 25 MILLIGRAM(S): at 06:20

## 2021-12-17 RX ADMIN — HEPARIN SODIUM 5000 UNIT(S): 5000 INJECTION INTRAVENOUS; SUBCUTANEOUS at 13:05

## 2021-12-17 NOTE — PROGRESS NOTE ADULT - PROBLEM SELECTOR PLAN 1
-f/u stool caloprotectin   -serum sodium within normal  -patient refusing inpatient colonoscopy  outpatient colonoscopy to r/o IBD

## 2021-12-17 NOTE — PROGRESS NOTE ADULT - PROBLEM SELECTOR PLAN 2
CT showing cystic lesion of pancreatic neck increase in size  further evaluation with [facility] - inpatient verus outpatient with Dr. Miller  -CEA 3.9  - normal  -lipase 343  -at this time patient wants to go home and follow up with Dr. Miller in 1 week.
CT showing cystic lesion of pancreatic neck increase in size  further evaluation with E[facility] - inpatient verus outpatient with Dr. Miller

## 2021-12-17 NOTE — MEDICAL STUDENT PROGRESS NOTE(EDUCATION) - NS MD HP STUD ASPLAN PLAN FT
1. Small bowel obstruction  - Advance diet as tolerated   - Follow up with blood culture   - Encourage for ambulating   - DVT prophylaxis.  - Pain control PRN.  2. Hypokalemia   - K repletion  - Follow up with lab tomorrow AM 
1. Small bowel obstruction  - NPO  - IVF.  - c/w NGT with low wall suction.  - Encourage for ambulating   - DVT prophylaxis.  - Pain control PRN.  - Follow up with lab tomorrow AM  2. Hypokalemia   - K repletion  - Follow up with lab tomorrow AM 
1. Small bowel obstruction  - Tolerated regular diet     2. Hypophosphatemia    - Electrolyte repletion    Discharge home and follow up with GI for colonoscopy to rule out IBD and also repeat CT or MRI for pancreatic cyst.

## 2021-12-17 NOTE — MEDICAL STUDENT PROGRESS NOTE(EDUCATION) - NS MD HP STUD ASPLAN ASSES FT
66 y.o female with PMH HTN, HLD, and bowel obstruction x 3 with no PSH with small bowel obstruction. Afebrile, VSS, tolerated regular diet, blood culture negative, RVP negative, hypophosphatemia 2.3

## 2021-12-17 NOTE — PROGRESS NOTE ADULT - ATTENDING COMMENTS
fever of uncertain etiology. it is definitely not abdominal in nature; patient requires EUS for pancreatic cyst and a colonoscopy, which she is refusing to do while in the hospital. She is preferring to do these as outpatient. Risks of waiting explained.   Advance diet as tolerated and DC with GI follow-up.

## 2021-12-17 NOTE — MEDICAL STUDENT PROGRESS NOTE(EDUCATION) - SUBJECTIVE AND OBJECTIVE BOX
66 y.o female with PMH HTN, HLD, and bowel obstruction x 3 with no PSH with small bowel obstruction day 5.   Today, the patient seen and examine at bedside. Pt is resting comfortably ambulated, tolerated clear diet, diarrhea improved  pt denied any abdominal pain, N&V, chest pain, SOB, cough, or dysuria.     Vital Signs Last 24 Hrs  T(C): 37.1 (17 Dec 2021 12:47), Max: 37.1 (17 Dec 2021 12:47)  T(F): 98.7 (17 Dec 2021 12:47), Max: 98.7 (17 Dec 2021 12:47)  HR: 74 (17 Dec 2021 12:47) (67 - 74)  BP: 128/71 (17 Dec 2021 12:47) (125/65 - 129/79)  BP(mean): --  RR: 18 (17 Dec 2021 12:47) (16 - 18)  SpO2: 99% (17 Dec 2021 12:47) (99% - 100%)                          10.8   4.83  )-----------( 156      ( 17 Dec 2021 07:04 )             32.8   12-17    143  |  113<H>  |  8   ----------------------------<  84  3.9   |  24  |  0.80    Ca    8.6      17 Dec 2021 07:04  Phos  2.3     12-17  Mg     1.7     12-17    TPro  6.0  /  Alb  2.9<L>  /  TBili  0.4  /  DBili  x   /  AST  25  /  ALT  36  /  AlkPhos  36<L>  12-17  MEDICATIONS  (STANDING):  heparin   Injectable 5000 Unit(s) SubCutaneous every 8 hours  hydrochlorothiazide 12.5 milliGRAM(s) Oral daily  lisinopril 20 milliGRAM(s) Oral daily  metoprolol succinate ER 25 milliGRAM(s) Oral daily  sertraline 50 milliGRAM(s) Oral daily    MEDICATIONS  (PRN):  benzocaine 15 mG/menthol 3.6 mG (Sugar-Free) Lozenge 1 Lozenge Oral every 4 hours PRN Sore Throat  ondansetron Injectable 4 milliGRAM(s) IV Push every 6 hours PRN Nausea and/or Vomiting               Physical Exam:    General: Well appearing, AOx3, and no acute distress.  Lung: Breath sounds clear and equal bilaterally. No respiratory distress, wheeze or rhonchi  Heart: Normal rate, regular rhythm.  Heart sounds S1, S2.  No murmurs, rubs or gallops.  Abdominal: Abdomen soft, non-tender, non-distended. Normal bowel sounds. No guarding or rebound.  EXT: No edema, tenderness or swelling

## 2021-12-18 ENCOUNTER — TRANSCRIPTION ENCOUNTER (OUTPATIENT)
Age: 66
End: 2021-12-18

## 2021-12-18 VITALS
TEMPERATURE: 98 F | DIASTOLIC BLOOD PRESSURE: 65 MMHG | RESPIRATION RATE: 18 BRPM | OXYGEN SATURATION: 96 % | SYSTOLIC BLOOD PRESSURE: 104 MMHG | HEART RATE: 82 BPM

## 2021-12-18 LAB
ANION GAP SERPL CALC-SCNC: 5 MMOL/L — SIGNIFICANT CHANGE UP (ref 5–17)
BUN SERPL-MCNC: 12 MG/DL — SIGNIFICANT CHANGE UP (ref 7–18)
CALCIUM SERPL-MCNC: 8.4 MG/DL — SIGNIFICANT CHANGE UP (ref 8.4–10.5)
CHLORIDE SERPL-SCNC: 112 MMOL/L — HIGH (ref 96–108)
CO2 SERPL-SCNC: 27 MMOL/L — SIGNIFICANT CHANGE UP (ref 22–31)
CREAT SERPL-MCNC: 0.8 MG/DL — SIGNIFICANT CHANGE UP (ref 0.5–1.3)
GLUCOSE SERPL-MCNC: 104 MG/DL — HIGH (ref 70–99)
HCT VFR BLD CALC: 30.6 % — LOW (ref 34.5–45)
HGB BLD-MCNC: 10.3 G/DL — LOW (ref 11.5–15.5)
MCHC RBC-ENTMCNC: 28.5 PG — SIGNIFICANT CHANGE UP (ref 27–34)
MCHC RBC-ENTMCNC: 33.7 GM/DL — SIGNIFICANT CHANGE UP (ref 32–36)
MCV RBC AUTO: 84.5 FL — SIGNIFICANT CHANGE UP (ref 80–100)
NRBC # BLD: 0 /100 WBCS — SIGNIFICANT CHANGE UP (ref 0–0)
PLATELET # BLD AUTO: 163 K/UL — SIGNIFICANT CHANGE UP (ref 150–400)
POTASSIUM SERPL-MCNC: 3.6 MMOL/L — SIGNIFICANT CHANGE UP (ref 3.5–5.3)
POTASSIUM SERPL-SCNC: 3.6 MMOL/L — SIGNIFICANT CHANGE UP (ref 3.5–5.3)
RBC # BLD: 3.62 M/UL — LOW (ref 3.8–5.2)
RBC # FLD: 13.2 % — SIGNIFICANT CHANGE UP (ref 10.3–14.5)
SODIUM SERPL-SCNC: 144 MMOL/L — SIGNIFICANT CHANGE UP (ref 135–145)
WBC # BLD: 6.04 K/UL — SIGNIFICANT CHANGE UP (ref 3.8–10.5)
WBC # FLD AUTO: 6.04 K/UL — SIGNIFICANT CHANGE UP (ref 3.8–10.5)

## 2021-12-18 PROCEDURE — 86301 IMMUNOASSAY TUMOR CA 19-9: CPT

## 2021-12-18 PROCEDURE — 82378 CARCINOEMBRYONIC ANTIGEN: CPT

## 2021-12-18 PROCEDURE — 87040 BLOOD CULTURE FOR BACTERIA: CPT

## 2021-12-18 PROCEDURE — 85027 COMPLETE CBC AUTOMATED: CPT

## 2021-12-18 PROCEDURE — G1004: CPT

## 2021-12-18 PROCEDURE — 86304 IMMUNOASSAY TUMOR CA 125: CPT

## 2021-12-18 PROCEDURE — 81001 URINALYSIS AUTO W/SCOPE: CPT

## 2021-12-18 PROCEDURE — 86900 BLOOD TYPING SEROLOGIC ABO: CPT

## 2021-12-18 PROCEDURE — 87086 URINE CULTURE/COLONY COUNT: CPT

## 2021-12-18 PROCEDURE — 96374 THER/PROPH/DIAG INJ IV PUSH: CPT

## 2021-12-18 PROCEDURE — 85730 THROMBOPLASTIN TIME PARTIAL: CPT

## 2021-12-18 PROCEDURE — 36415 COLL VENOUS BLD VENIPUNCTURE: CPT

## 2021-12-18 PROCEDURE — 0225U NFCT DS DNA&RNA 21 SARSCOV2: CPT

## 2021-12-18 PROCEDURE — 85610 PROTHROMBIN TIME: CPT

## 2021-12-18 PROCEDURE — 74250 X-RAY XM SM INT 1CNTRST STD: CPT

## 2021-12-18 PROCEDURE — 86850 RBC ANTIBODY SCREEN: CPT

## 2021-12-18 PROCEDURE — 83690 ASSAY OF LIPASE: CPT

## 2021-12-18 PROCEDURE — 74177 CT ABD & PELVIS W/CONTRAST: CPT | Mod: MG

## 2021-12-18 PROCEDURE — 99285 EMERGENCY DEPT VISIT HI MDM: CPT

## 2021-12-18 PROCEDURE — 83735 ASSAY OF MAGNESIUM: CPT

## 2021-12-18 PROCEDURE — 84100 ASSAY OF PHOSPHORUS: CPT

## 2021-12-18 PROCEDURE — 80048 BASIC METABOLIC PNL TOTAL CA: CPT

## 2021-12-18 PROCEDURE — 99232 SBSQ HOSP IP/OBS MODERATE 35: CPT

## 2021-12-18 PROCEDURE — 80053 COMPREHEN METABOLIC PANEL: CPT

## 2021-12-18 PROCEDURE — 83605 ASSAY OF LACTIC ACID: CPT

## 2021-12-18 PROCEDURE — 86901 BLOOD TYPING SEROLOGIC RH(D): CPT

## 2021-12-18 PROCEDURE — 71045 X-RAY EXAM CHEST 1 VIEW: CPT

## 2021-12-18 PROCEDURE — 87635 SARS-COV-2 COVID-19 AMP PRB: CPT

## 2021-12-18 PROCEDURE — 93005 ELECTROCARDIOGRAM TRACING: CPT

## 2021-12-18 PROCEDURE — 85025 COMPLETE CBC W/AUTO DIFF WBC: CPT

## 2021-12-18 RX ADMIN — SERTRALINE 50 MILLIGRAM(S): 25 TABLET, FILM COATED ORAL at 11:20

## 2021-12-18 RX ADMIN — HEPARIN SODIUM 5000 UNIT(S): 5000 INJECTION INTRAVENOUS; SUBCUTANEOUS at 05:44

## 2021-12-18 NOTE — DISCHARGE NOTE NURSING/CASE MANAGEMENT/SOCIAL WORK - NSDCPEFALRISK_GEN_ALL_CORE
For information on Fall & Injury Prevention, visit: [email or web address] OR  [email or web address] OR  https://www.cdc.gov/steadi/patient.html

## 2021-12-18 NOTE — PROGRESS NOTE ADULT - REASON FOR ADMISSION
small bowel obstruction

## 2021-12-18 NOTE — PROGRESS NOTE ADULT - PROVIDER SPECIALTY LIST ADULT
Surgery
Surgery
Gastroenterology
Gastroenterology
Surgery
Gastroenterology
Gastroenterology

## 2021-12-18 NOTE — DISCHARGE NOTE NURSING/CASE MANAGEMENT/SOCIAL WORK - NSDCVIVACCINE_GEN_ALL_CORE_FT
Tdap; 18-Sep-2021 13:26; Norma Sabillon (KENNETH); [employer]; [id number] (Exp. Date: 15-Jul-2023); IntraMuscular; Deltoid Left.; 0.5 milliLiter(s); VIS (VIS Published: 09-May-2013, VIS Presented: 18-Sep-2021);

## 2021-12-18 NOTE — DISCHARGE NOTE NURSING/CASE MANAGEMENT/SOCIAL WORK - INFLUENZA IMMUNIZATION DATE (APPROXIMATE):
Caller declined . offer. Reason for Disposition   Patient wants to be seen    Answer Assessment - Initial Assessment Questions  1. DESCRIPTION: \"Describe your dizziness. 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 Things started going dark. When going high up on fork lift,  I felt like I was going to fall. 2. LIGHTHEADED: \"Do you feel lightheaded? \" (e.g., somewhat faint, woozy, weak upon standing)      I was shaking bad when I got off fork lift. My heart was pounding, fast and hard. 3. VERTIGO: \"Do you feel like either you or the room is spinning or tilting? \" (i.e. vertigo)      Tilting    4. SEVERITY: \"How bad is it? \"  \"Do you feel like you are going to faint? \" \"Can you stand and walk? \"    - MILD - walking normally    - MODERATE - interferes with normal activities (e.g., work, school)     - SEVERE - unable to stand, requires support to walk, feels like passing out now.       moderate    5. ONSET:  \"When did the dizziness begin? \"      Yes    6. AGGRAVATING FACTORS: \"Does anything make it worse? \" (e.g., standing, change in head position)      Going up to higher level made it worse    7. HEART RATE: \"Can you tell me your heart rate? \" \"How many beats in 15 seconds? \"  (Note: not all patients can do this)        Unable    8. CAUSE: \"What do you think is causing the dizziness? \"       If I'm up at high,  I'm scared of heights. I don't like to look down,when I\"m at a higher level    9. RECURRENT SYMPTOM: \"Have you had dizziness before? \" If so, ask: \"When was the last time? \" \"What happened that time? \"      No    10. OTHER SYMPTOMS: \"Do you have any other symptoms? \" (e.g., fever, chest pain, vomiting, diarrhea, bleeding)        Denies all symptoms above plus headache. 11. PREGNANCY: \"Is there any chance you are pregnant? \" \"When was your last menstrual period? \"        Just had a miscarriage.     Protocols used: DIZZINESS-ADULT-[place]    Received call from Coral Patel at [facility] [place] with Red Flag Complaint. Brief description of triage: dizziness at high elevation. 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 Needs note from MD concerning her inablity to work at heights. Triage indicates for patient to be seen today or first    Care advice provided, patient verbalizes understanding; denies any other questions or concerns; instructed to call back for any new or worsening symptoms. Writer provided warm transfer to [facility] for appointment scheduling. Attention Provider: Thank you for allowing me to participate in the care of your patient. The patient was connected to triage in response to information provided to the [facility]. Please do not respond through this encounter as the response is not directed to a shared pool. 09-Sep-2018

## 2021-12-18 NOTE — DISCHARGE NOTE NURSING/CASE MANAGEMENT/SOCIAL WORK - PATIENT PORTAL LINK FT
You can access the FollowMyHealth Patient Portal offered by [facility] by registering at the following website: [email or web address]. By joining [facility]’s FollowMyHealth portal, you will also be able to view your health information using other applications (apps) compatible with our system.

## 2021-12-18 NOTE — PROGRESS NOTE ADULT - ASSESSMENT
66 y.o. female with resoled recurrent SBO. Pancreatic cyst.    Tmax 100.5  Hypophosphatemia   Fever work up negative     -Regular diet   -Replete electrolytes   -GI f/u   -OOB/ambulating  -DVT ppx   -DC planning 
66y.o. Female with resolving recurrent SBO    -d/c NGT  -Start clears today  -Observation  -OOB  -Resume home meds once lindsey clears
65 y/o Female w/ SBO, hypokalemia     -F/u SBS   -Replete K levels   -NGT lws  -NPO  -IVF   -C/w Home medication   -OOB/Ambulate   -DVT ppx   
66 y.o. female with resolving recurrent SBO. Febrile overnight 102F. tachy 105. CEA elevated, 3.9. Hypokalemia    -CLD as tolerated  -F/u blood cultures  -Monitor/replete electrolytes   -F/u Calprotectin  -F/u GI recs   -OOB/ambulating  -Discussed with Dr. Lezama 
GI following this 66F with virgin abdomen and hx of multiple small bowel obstructions with transition point at distal ileum. S/P SBS showing Improved small bowel obstruction
66M with resovled recurrent SBO. We are recommending EUS and colonoscopy in patient but she refused; Will do as outpatient.    1) DC home with GI follow-up
GI following this 66F with virgin abdomen and hx of multiple small bowel obstructions with transition point at distal ileum
GI following this 66F with virgin abdomen and hx of multiple small bowel obstructions with transition point at distal ileum. S/P SBS showing Improved small bowel obstruction. Need colonoscopy but patient is refusing and wants to have it done outpatient. On CT scan noted to have increased in size pancreatic cyst 3cm. instructed patient to have EGD/EUS done but patient wants it to be done outpatient with Dr. Miller. Overal patient is feeling well. Tolerating meals and one BM today.   
GI following this 66F with virgin abdomen and hx of multiple small bowel obstructions with transition point at distal ileum. S/P SBS showing Improved small bowel obstruction. Need colonoscopy.

## 2021-12-18 NOTE — PROGRESS NOTE ADULT - SUBJECTIVE AND OBJECTIVE BOX
GI PROGRESS NOTE    Patient is a 66y old  Female who presents with a chief complaint of small bowel obstruction (17 Dec 2021 08:28)      HPI:  66 year old female with PMH HTN, HLD, anxiety and bowel obstruction x 3 over the past 10 years with no PSH presents to the ED complaining of diffuse abdominal pain which has improved, nausea and vomiting x1. Last BM or flatus was Saturday. History of gastric ulcer in the past, but unsure how long ago. Denies GI workup with colonoscopy or capsule endoscopy despite prior recommendations. Denies weight loss, constipation, diarrhea between episodes, chest pain, shortness of breath, fever, chills, dysuria.  (13 Dec 2021 11:40)          ______________________________________________________________________  PMH/PSH:  PAST MEDICAL & SURGICAL HISTORY:  Anxiety    HTN (hypertension)    Herpes simplex    Hypercholesteremia    Heart murmur    UTI (urinary tract infection)    SBO (small bowel obstruction)    Gastritis    HLD (hyperlipidemia)    No significant past surgical history      ______________________________________________________________________  MEDS:  MEDICATIONS  (STANDING):  heparin   Injectable 5000 Unit(s) SubCutaneous every 8 hours  hydrochlorothiazide 12.5 milliGRAM(s) Oral daily  lisinopril 20 milliGRAM(s) Oral daily  metoprolol succinate ER 25 milliGRAM(s) Oral daily  sertraline 50 milliGRAM(s) Oral daily    MEDICATIONS  (PRN):  benzocaine 15 mG/menthol 3.6 mG (Sugar-Free) Lozenge 1 Lozenge Oral every 4 hours PRN Sore Throat  ondansetron Injectable 4 milliGRAM(s) IV Push every 6 hours PRN Nausea and/or Vomiting    ______________________________________________________________________  ALL:   Allergies    No Known Allergies    Intolerances      ______________________________________________________________________  SH: Social History:    ______________________________________________________________________  FH:  FAMILY HISTORY:    ______________________________________________________________________  ROS:    CONSTITUTIONAL:  No weight loss, fever, chills, weakness or fatigue.    HEENT:  Eyes:  No visual loss, blurred vision, double vision or yellow sclerae. Ears, Nose, Throat:  No hearing loss, sneezing, congestion, runny nose or sore throat.    SKIN:  No rash or itching.    CARDIOVASCULAR:  No chest pain, chest pressure or chest discomfort. No palpitations or edema.    RESPIRATORY:  No shortness of breath, cough or sputum.    GASTROINTESTINAL:  SEE HPI    GENITOURINARY:  No dysuria, hematuria, urinary frequency    NEUROLOGICAL:  No headache, dizziness, syncope, paralysis, ataxia, numbness or tingling in the extremities. No change in bowel or bladder control.    MUSCULOSKELETAL:  No muscle, back pain, joint pain or stiffness.    HEMATOLOGIC:  No anemia, bleeding or bruising.    LYMPHATICS:  No enlarged nodes. No history of splenectomy.    PSYCHIATRIC:  No history of depression or anxiety.    ENDOCRINOLOGIC:  No reports of sweating, cold or heat intolerance. No polyuria or polydipsia.    ALLERGIES:  No history of asthma, hives, eczema or rhinitis.  ______________________________________________________________________  PHYSICAL EXAM:  T(C): 36.9 (12-17-21 @ 06:03), Max: 37.5 (12-16-21 @ 12:51)  HR: 73 (12-17-21 @ 06:03)  BP: 125/65 (12-17-21 @ 06:03)  RR: 18 (12-17-21 @ 06:03)  SpO2: 99% (12-17-21 @ 06:03)  Wt(kg): --      GEN: NAD   CVS- S1 S2  ABD: soft nontender, non distended, bowel sounds+  LUNGS: clear to auscultation  NEURO: noin focal neuro exam; AAO x 3  Extremities: no cyanosis, no calf tenderness, no edema, no clubbing      ______________________________________________________________________  LABS:                        10.8   4.83  )-----------( 156      ( 17 Dec 2021 07:04 )             32.8     12-17    143  |  113<H>  |  8   ----------------------------<  84  3.9   |  24  |  0.80    Ca    8.6      17 Dec 2021 07:04  Phos  2.3     12-17  Mg     1.7     12-17    TPro  6.0  /  Alb  2.9<L>  /  TBili  0.4  /  DBili  x   /  AST  25  /  ALT  36  /  AlkPhos  36<L>  12-17    LIVER FUNCTIONS - ( 17 Dec 2021 07:04 )  Alb: 2.9 g/dL / Pro: 6.0 g/dL / ALK PHOS: 36 U/L / ALT: 36 U/L DA / AST: 25 U/L / GGT: x           ______________________________________________________________________  IMAGING:    ______________________________________________________________________  < from: CT Abdomen and Pelvis w/ IV Cont (12.13.21 @ 10:41) >    ACC: [id number] EXAM:  CT ABDOMEN AND PELVIS IC                          PROCEDURE DATE:  12/13/2021          INTERPRETATION:  CLINICAL INFORMATION: Nausea and vomiting.    COMPARISON: October 9, 2018.    CONTRAST/COMPLICATIONS:  IV Contrast: Omnipaque 350.  90 mL administered.   10 mL discarded.  Oral Contrast: None.  Complications: None.    PROCEDURE:  CT of the Abdomen and Pelvis was performed.  Sagittal and coronal reformats were performed.    FINDINGS:  LOWER CHEST: Within normal limits.    LIVER: Within normal limits.  BILE DUCTS: Normal caliber.  GALLBLADDER: Cholelithiasis.  SPLEEN: Multiple cysts hypodense lesions throughout the spleen, some of   which have increased in size, including the largest one which measures   1.4 cm (series 2, image 53), previously 1.1 cm.  PANCREAS: Increased size of cystic lesion in the pancreatic neck (series   2, image 35), measuring 3.0 cm, previously 2.0 cm.  ADRENALS: Unchanged left adrenal parenchymal calcifications. Unchanged   tiny right adrenal nodule, measuring 0.6 cm.  KIDNEYS/URETERS: No hydronephrosis. Subcentimeter hypodense left renal   lesions, too small to characterize.    BLADDER: Within normal limits.  REPRODUCTIVE ORGANS: Uterine leiomyomas.    BOWEL: Small bowel obstruction with transition point in the distal ileum   (series 2, image 97). Appendix is normal.  PERITONEUM: No ascites.  VESSELS: Atherosclerotic changes.  RETROPERITONEUM/LYMPH NODES: No lymphadenopathy.  ABDOMINAL WALL: Small hiatal hernia.  BONES: Degenerativechanges.    IMPRESSION:  Small bowel obstruction with transition point in the distal ileum.    Increased size of cystic lesion in the pancreatic neck, measuring 3.0 cm,   previously 2.0 cm. Further evaluation with MRCP with contrast is   recommended.    Multiple indeterminate splenic lesions, some of which have slightly   increased in size.    --- End of Report ---    < end of copied text >          
INTERVAL HPI/OVERNIGHT EVENTS:  Patient seen and examined at bedside.   Pt resting comfortably.   Febrile overnight.  Tolerating diet.   flatus/diarrhea.   Denies N/V    MEDICATIONS  (STANDING):  heparin   Injectable 5000 Unit(s) SubCutaneous every 8 hours  hydrochlorothiazide 12.5 milliGRAM(s) Oral daily  lisinopril 20 milliGRAM(s) Oral daily  metoprolol succinate ER 25 milliGRAM(s) Oral daily  potassium chloride  10 mEq/100 mL IVPB 10 milliEquivalent(s) IV Intermittent every 1 hour  sertraline 50 milliGRAM(s) Oral daily    MEDICATIONS  (PRN):  benzocaine 15 mG/menthol 3.6 mG (Sugar-Free) Lozenge 1 Lozenge Oral every 4 hours PRN Sore Throat  ondansetron Injectable 4 milliGRAM(s) IV Push every 6 hours PRN Nausea and/or Vomiting      Vital Signs Last 24 Hrs  T(C): 37 (16 Dec 2021 05:34), Max: 38.9 (15 Dec 2021 22:53)  T(F): 98.6 (16 Dec 2021 05:34), Max: 102 (15 Dec 2021 22:53)  HR: 78 (16 Dec 2021 06:45) (66 - 105)  BP: 97/51 (16 Dec 2021 06:45) (97/45 - 126/61)  BP(mean): 63 (16 Dec 2021 06:45) (57 - 63)  RR: 15 (16 Dec 2021 05:34) (15 - 18)  SpO2: 98% (16 Dec 2021 05:34) (98% - 100%)    Physical:  General: A&Ox3. NAD.  Respirations: Unlabored   Abdomen: Soft nondistended, nontender, no rebound, no guarding, no peritonitis     I&O's Detail      LABS:                        10.5   5.89  )-----------( 155      ( 16 Dec 2021 06:12 )             31.1             12-16    143  |  113<H>  |  9   ----------------------------<  96  3.4<L>   |  26  |  0.86    Ca    8.0<L>      16 Dec 2021 06:12  Phos  2.3     12-15  Mg     2.1     12-15    TPro  6.4  /  Alb  2.8<L>  /  TBili  0.5  /  DBili  x   /  AST  15  /  ALT  18  /  AlkPhos  40  12-15    
SUBJECTIVE    Nausea [ ] YES [X ] NO  Vomiting [ ] YES [X ] NO  Voiding normally [X ] YES [ ] NO  Flatus [X ] YES [ ] NO  BM [X ] YES [ ] NO       Diarrhea [ ] YES [X ] NO  Pain under control [X ] YES [ ] NO--she has no pain at all  Tolerating reg diet  Ambulated [X ] YES NO [ ]      VITALS    ICU Vital Signs Last 24 Hrs  T(C): 36.6 (18 Dec 2021 05:43), Max: 37.1 (17 Dec 2021 12:47)  T(F): 97.9 (18 Dec 2021 05:43), Max: 98.8 (17 Dec 2021 20:40)  HR: 69 (18 Dec 2021 05:43) (66 - 74)  BP: 104/66 (18 Dec 2021 05:43) (100/49 - 132/71)  BP(mean): 79 (18 Dec 2021 05:43) (62 - 79)  ABP: --  ABP(mean): --  RR: 19 (18 Dec 2021 05:43) (18 - 19)  SpO2: 95% (18 Dec 2021 05:43) (95% - 99%)    I&O's Detail        PHYSICAL EXAMINATION    Abdomen: soft, NT, ND    I&O's Summary      LABS                        10.3   6.04  )-----------( 163      ( 18 Dec 2021 06:33 )             30.6             12-18    144  |  112<H>  |  12  ----------------------------<  104<H>  3.6   |  27  |  0.80    Ca    8.4      18 Dec 2021 06:33  Phos  2.3     12-17  Mg     1.7     12-17    TPro  6.0  /  Alb  2.9<L>  /  TBili  0.4  /  DBili  x   /  AST  25  /  ALT  36  /  AlkPhos  36<L>  12-17    LIVER FUNCTIONS - ( 17 Dec 2021 07:04 )  Alb: 2.9 g/dL / Pro: 6.0 g/dL / ALK PHOS: 36 U/L / ALT: 36 U/L DA / AST: 25 U/L / GGT: x             MEDICATIONS:  MEDICATIONS  (STANDING):  heparin   Injectable 5000 Unit(s) SubCutaneous every 8 hours  hydrochlorothiazide 12.5 milliGRAM(s) Oral daily  lisinopril 20 milliGRAM(s) Oral daily  metoprolol succinate ER 25 milliGRAM(s) Oral daily  sertraline 50 milliGRAM(s) Oral daily    MEDICATIONS  (PRN):  benzocaine 15 mG/menthol 3.6 mG (Sugar-Free) Lozenge 1 Lozenge Oral every 4 hours PRN Sore Throat  ondansetron Injectable 4 milliGRAM(s) IV Push every 6 hours PRN Nausea and/or Vomiting      
    GI PROGRESS NOTE    Patient is a 66y old  Female who presents with a chief complaint of small bowel obstruction (13 Dec 2021 15:56)      HPI:  66 year old female with PMH HTN, HLD, anxiety and bowel obstruction x 3 over the past 10 years with no PSH presents to the ED complaining of diffuse abdominal pain which has improved, nausea and vomiting x1. Last BM or flatus was Saturday. History of gastric ulcer in the past, but unsure how long ago. Denies GI workup with colonoscopy or capsule endoscopy despite prior recommendations. Denies weight loss, constipation, diarrhea between episodes, chest pain, shortness of breath, fever, chills, dysuria.  (13 Dec 2021 11:40)          ______________________________________________________________________  PMH/PSH:  PAST MEDICAL & SURGICAL HISTORY:  Anxiety    HTN (hypertension)    Herpes simplex    Hypercholesteremia    Heart murmur    UTI (urinary tract infection)    SBO (small bowel obstruction)    Gastritis    HLD (hyperlipidemia)    No significant past surgical history      ______________________________________________________________________  MEDS:  MEDICATIONS  (STANDING):  enalaprilat Injectable 1.25 milliGRAM(s) IV Push every 6 hours  heparin   Injectable 5000 Unit(s) SubCutaneous every 8 hours  metoprolol tartrate Injectable 2.5 milliGRAM(s) IV Push every 6 hours  sodium chloride 0.9%. 1000 milliLiter(s) (125 mL/Hr) IV Continuous <Continuous>    MEDICATIONS  (PRN):  benzocaine 15 mG/menthol 3.6 mG (Sugar-Free) Lozenge 1 Lozenge Oral every 4 hours PRN Sore Throat  ondansetron Injectable 4 milliGRAM(s) IV Push every 6 hours PRN Nausea and/or Vomiting    ______________________________________________________________________  ALL:   Allergies    No Known Allergies    Intolerances      ______________________________________________________________________  SH: Social History:    ______________________________________________________________________  FH:  FAMILY HISTORY:    ______________________________________________________________________  ROS:    CONSTITUTIONAL:  No weight loss, fever, chills, weakness or fatigue.    HEENT:  Eyes:  No visual loss, blurred vision, double vision or yellow sclerae. Ears, Nose, Throat:  No hearing loss, sneezing, congestion, runny nose or sore throat.    SKIN:  No rash or itching.    CARDIOVASCULAR:  No chest pain, chest pressure or chest discomfort. No palpitations or edema.    RESPIRATORY:  No shortness of breath, cough or sputum.    GASTROINTESTINAL:  SEE HPI    GENITOURINARY:  No dysuria, hematuria, urinary frequency    NEUROLOGICAL:  No headache, dizziness, syncope, paralysis, ataxia, numbness or tingling in the extremities. No change in bowel or bladder control.    MUSCULOSKELETAL:  No muscle, back pain, joint pain or stiffness.    HEMATOLOGIC:  No anemia, bleeding or bruising.    LYMPHATICS:  No enlarged nodes. No history of splenectomy.    PSYCHIATRIC:  No history of depression or anxiety.    ENDOCRINOLOGIC:  No reports of sweating, cold or heat intolerance. No polyuria or polydipsia.    ALLERGIES:  No history of asthma, hives, eczema or rhinitis.    GI HPI: Patient seen and examined. +NGT no BM, not passing gas. Denies abdominal pain, no nausea or vomiting.  Looks comfortable OOB-Chair  ______________________________________________________________________  PHYSICAL EXAM:  T(C): 36.9 (12-14-21 @ 05:33), Max: 37.1 (12-13-21 @ 16:32)  HR: 70 (12-14-21 @ 05:33)  BP: 113/56 (12-14-21 @ 05:33)  RR: 18 (12-14-21 @ 05:33)  SpO2: 99% (12-14-21 @ 05:33)  Wt(kg): --    12-13 - 12-14  --------------------------------------------------------  IN:    sodium chloride 0.9%: 1375 mL  Total IN: 1375 mL    OUT:    Nasogastric/Oral tube (mL): 550 mL  Total OUT: 550 mL    Total NET: 825 mL          GEN: NAD   CVS- S1 S2  ABD: soft nontender, non distended, bowel sounds+  LUNGS: clear to auscultation  NEURO: non focal neuro exam;  Extremities: no cyanosis, no calf tenderness, no edema, no clubbing      ______________________________________________________________________  LABS:                        15.4   15.09 )-----------( 237      ( 13 Dec 2021 08:37 )             45.8     12-14    146<H>  |  114<H>  |  21<H>  ----------------------------<  110<H>  3.4<L>   |  23  |  0.93    Ca    8.5      14 Dec 2021 08:56    TPro  8.9<H>  /  Alb  3.8  /  TBili  0.9  /  DBili  x   /  AST  31  /  ALT  30  /  AlkPhos  61  12-13    LIVER FUNCTIONS - ( 13 Dec 2021 08:37 )  Alb: 3.8 g/dL / Pro: 8.9 g/dL / ALK PHOS: 61 U/L / ALT: 30 U/L DA / AST: 31 U/L / GGT: x           ______________________________________________________________________  IMAGING:    ______________________________________________________________________  ASSESSMENT:  66y Female    PLAN:        
INTERVAL HPI/OVERNIGHT EVENTS:    Pt seen and examined at bedside. Offers no acute complaints, diarrhea resolving. Tolerating reg diet well.    Tmax 100.5 ar 12/16/21 1AM     Vital Signs Last 24 Hrs  T(C): 36.9 (17 Dec 2021 06:03), Max: 37.5 (16 Dec 2021 12:51)  T(F): 98.4 (17 Dec 2021 06:03), Max: 99.5 (16 Dec 2021 12:51)  HR: 73 (17 Dec 2021 06:03) (67 - 73)  BP: 125/65 (17 Dec 2021 06:03) (116/72 - 129/79)  BP(mean): --  RR: 18 (17 Dec 2021 06:03) (16 - 18)  SpO2: 99% (17 Dec 2021 06:03) (98% - 100%)  I&O's Detail        Physical Exam  General: AAOx3, No acute distress  Skin: No jaundice, no icterus  Abdomen: soft,  nondistended, nontender, no rebound tenderness, no guarding, no palpable masses  Extremities: non edematous, no calf pain bilaterally        Labs:                        10.8   4.83  )-----------( 156      ( 17 Dec 2021 07:04 )             32.8     12-17    143  |  113<H>  |  8   ----------------------------<  84  3.9   |  24  |  0.80    Ca    8.6      17 Dec 2021 07:04  Phos  2.3     12-17  Mg     1.7     12-17    TPro  6.0  /  Alb  2.9<L>  /  TBili  0.4  /  DBili  x   /  AST  25  /  ALT  36  /  AlkPhos  36<L>  12-17      
INTERVAL HPI/OVERNIGHT EVENTS:  Pt resting comfortably. No acute complaints.   NGT to LWS.  SBS results noted: contrast throughout colon after 4 hrs.  +diarrhea after contrast.   Denies N/V.    MEDICATIONS  (STANDING):  dextrose 5% + sodium chloride 0.9% with potassium chloride 20 mEq/L 1000 milliLiter(s) (125 mL/Hr) IV Continuous <Continuous>  enalaprilat Injectable 1.25 milliGRAM(s) IV Push every 6 hours  heparin   Injectable 5000 Unit(s) SubCutaneous every 8 hours  metoprolol tartrate Injectable 2.5 milliGRAM(s) IV Push every 6 hours  sodium chloride 0.9%. 1000 milliLiter(s) (125 mL/Hr) IV Continuous <Continuous>    MEDICATIONS  (PRN):  benzocaine 15 mG/menthol 3.6 mG (Sugar-Free) Lozenge 1 Lozenge Oral every 4 hours PRN Sore Throat  ondansetron Injectable 4 milliGRAM(s) IV Push every 6 hours PRN Nausea and/or Vomiting    Vital Signs Last 24 Hrs  T(C): 37.1 (15 Dec 2021 05:00), Max: 37.4 (14 Dec 2021 21:33)  T(F): 98.8 (15 Dec 2021 05:00), Max: 99.3 (14 Dec 2021 21:33)  HR: 69 (15 Dec 2021 05:00) (65 - 70)  BP: 106/51 (15 Dec 2021 05:00) (106/51 - 118/55)  BP(mean): --  RR: 18 (15 Dec 2021 05:00) (17 - 18)  SpO2: 98% (15 Dec 2021 05:00) (97% - 100%)    Physical:  General: A&Ox3. NAD.  Abdomen: Soft nondistended, nontender.    I&O's Detail    14 Dec 2021 07:01  -  15 Dec 2021 07:00  --------------------------------------------------------  IN:    sodium chloride 0.9%: 1500 mL  Total IN: 1500 mL    OUT:    Nasogastric/Oral tube (mL): 750 mL  Total OUT: 750 mL    Total NET: 750 mL    LABS:                        11.1   4.64  )-----------( 160      ( 15 Dec 2021 07:06 )             34.8             12-15    151<H>  |  120<H>  |  18  ----------------------------<  122<H>  4.2   |  26  |  0.80    Ca    8.2<L>      15 Dec 2021 07:06  Phos  2.3     12-15  Mg     2.1     12-15    TPro  8.9<H>  /  Alb  3.8  /  TBili  0.9  /  DBili  x   /  AST  31  /  ALT  30  /  AlkPhos  61  12-13    
INTERVAL HPI/OVERNIGHT EVENTS:    Pt seen and examined at bedside. Abdominal pain improved, denies nausea or vomiting, no fever, chills, SOB or CP.   -f/bm   Pt is NPO  NGT lws       Vital Signs Last 24 Hrs  T(C): 36.9 (14 Dec 2021 05:33), Max: 37.1 (13 Dec 2021 16:32)  T(F): 98.4 (14 Dec 2021 05:33), Max: 98.7 (13 Dec 2021 16:32)  HR: 70 (14 Dec 2021 05:33) (70 - 99)  BP: 113/56 (14 Dec 2021 05:33) (99/62 - 113/56)  BP(mean): 66 (13 Dec 2021 21:47) (66 - 84)  RR: 18 (14 Dec 2021 05:33) (17 - 19)  SpO2: 99% (14 Dec 2021 05:33) (98% - 100%)  I&O's Detail    13 Dec 2021 07:01  -  14 Dec 2021 07:00  --------------------------------------------------------  IN:    sodium chloride 0.9%: 1375 mL  Total IN: 1375 mL    OUT:    Nasogastric/Oral tube (mL): 550 mL  Total OUT: 550 mL    Total NET: 825 mL      Physical Exam  General: AAOx3, No acute distress  Skin: No jaundice, no icterus  Abdomen: soft,  nondistended, nontender, no rebound tenderness, no guarding, no palpable masses  Extremities: non edematous, no calf pain bilaterally        Labs:                        12.7   4.74  )-----------( 157      ( 14 Dec 2021 08:56 )             37.8     12-14    146<H>  |  114<H>  |  21<H>  ----------------------------<  110<H>  3.4<L>   |  23  |  0.93    Ca    8.5      14 Dec 2021 08:56    TPro  8.9<H>  /  Alb  3.8  /  TBili  0.9  /  DBili  x   /  AST  31  /  ALT  30  /  AlkPhos  61  12-13    PT/INR - ( 14 Dec 2021 07:49 )   PT: 14.5 sec;   INR: 1.23 ratio         PTT - ( 14 Dec 2021 07:49 )  PTT:36.1 sec    
    GI PROGRESS NOTE    Patient is a 66y old  Female who presents with a chief complaint of small bowel obstruction (14 Dec 2021 09:48)      HPI:  66 year old female with PMH HTN, HLD, anxiety and bowel obstruction x 3 over the past 10 years with no PSH presents to the ED complaining of diffuse abdominal pain which has improved, nausea and vomiting x1. Last BM or flatus was Saturday. History of gastric ulcer in the past, but unsure how long ago. Denies GI workup with colonoscopy or capsule endoscopy despite prior recommendations. Denies weight loss, constipation, diarrhea between episodes, chest pain, shortness of breath, fever, chills, dysuria.  (13 Dec 2021 11:40)          ______________________________________________________________________  PMH/PSH:  PAST MEDICAL & SURGICAL HISTORY:  Anxiety    HTN (hypertension)    Herpes simplex    Hypercholesteremia    Heart murmur    UTI (urinary tract infection)    SBO (small bowel obstruction)    Gastritis    HLD (hyperlipidemia)    No significant past surgical history      ______________________________________________________________________  MEDS:  MEDICATIONS  (STANDING):  heparin   Injectable 5000 Unit(s) SubCutaneous every 8 hours  hydrochlorothiazide 12.5 milliGRAM(s) Oral daily  lisinopril 20 milliGRAM(s) Oral daily  metoprolol succinate ER 25 milliGRAM(s) Oral daily  sertraline 50 milliGRAM(s) Oral daily  sodium chloride 0.45%. 1000 milliLiter(s) (70 mL/Hr) IV Continuous <Continuous>    MEDICATIONS  (PRN):  benzocaine 15 mG/menthol 3.6 mG (Sugar-Free) Lozenge 1 Lozenge Oral every 4 hours PRN Sore Throat  ondansetron Injectable 4 milliGRAM(s) IV Push every 6 hours PRN Nausea and/or Vomiting    ______________________________________________________________________  ALL:   Allergies    No Known Allergies    Intolerances      ______________________________________________________________________  SH: Social History:    ______________________________________________________________________  FH:  FAMILY HISTORY:    ______________________________________________________________________  ROS:    CONSTITUTIONAL:  No weight loss, fever, chills, weakness or fatigue.    HEENT:  Eyes:  No visual loss, blurred vision, double vision or yellow sclerae. Ears, Nose, Throat:  No hearing loss, sneezing, congestion, runny nose or sore throat.    SKIN:  No rash or itching.    CARDIOVASCULAR:  No chest pain, chest pressure or chest discomfort. No palpitations or edema.    RESPIRATORY:  No shortness of breath, cough or sputum.    GASTROINTESTINAL:  SEE HPI    GENITOURINARY:  No dysuria, hematuria, urinary frequency    NEUROLOGICAL:  No headache, dizziness, syncope, paralysis, ataxia, numbness or tingling in the extremities. No change in bowel or bladder control.    MUSCULOSKELETAL:  No muscle, back pain, joint pain or stiffness.    HEMATOLOGIC:  No anemia, bleeding or bruising.    LYMPHATICS:  No enlarged nodes. No history of splenectomy.    PSYCHIATRIC:  No history of depression or anxiety.    ENDOCRINOLOGIC:  No reports of sweating, cold or heat intolerance. No polyuria or polydipsia.    ALLERGIES:  No history of asthma, hives, eczema or rhinitis.    GI HPI: Patient seen and examined. Reports feeling better. No abdominal pain, no nausea or vomiting. NGT out. + BM (diarrhea) after drinking contrast. Tolerating clear liquids. Patient states she prefers to have colonoscopy as an outpatient.  ______________________________________________________________________  PHYSICAL EXAM:  T(C): 36.4 (12-15-21 @ 10:47), Max: 37.4 (12-14-21 @ 21:33)  HR: 66 (12-15-21 @ 10:47)  BP: 120/74 (12-15-21 @ 10:47)  RR: 18 (12-15-21 @ 10:47)  SpO2: 100% (12-15-21 @ 10:47)  Wt(kg): --    12-14  -  12-15  --------------------------------------------------------  IN:    sodium chloride 0.9%: 1500 mL  Total IN: 1500 mL    OUT:    Nasogastric/Oral tube (mL): 750 mL  Total OUT: 750 mL    Total NET: 750 mL          GEN: NAD   CVS- S1 S2  ABD: soft nontender, non distended, bowel sounds+, no rebound, no guarding  LUNGS: clear to auscultation  NEURO: non focal neuro exam;   Extremities: no cyanosis, no calf tenderness, no edema, no clubbing      ______________________________________________________________________  LABS:                        11.1   4.64  )-----------( 160      ( 15 Dec 2021 07:06 )             34.8     12-15    151<H>  |  120<H>  |  18  ----------------------------<  122<H>  4.2   |  26  |  0.80    Ca    8.2<L>      15 Dec 2021 07:06  Phos  2.3     12-15  Mg     2.1     12-15            
    GI PROGRESS NOTE    Patient is a 66y old  Female who presents with a chief complaint of small bowel obstruction (16 Dec 2021 14:09)      HPI:  66 year old female with PMH HTN, HLD, anxiety and bowel obstruction x 3 over the past 10 years with no PSH presents to the ED complaining of diffuse abdominal pain which has improved, nausea and vomiting x1. Last BM or flatus was Saturday. History of gastric ulcer in the past, but unsure how long ago. Denies GI workup with colonoscopy or capsule endoscopy despite prior recommendations. Denies weight loss, constipation, diarrhea between episodes, chest pain, shortness of breath, fever, chills, dysuria.  (13 Dec 2021 11:40)          ______________________________________________________________________  PMH/PSH:  PAST MEDICAL & SURGICAL HISTORY:  Anxiety    HTN (hypertension)    Herpes simplex    Hypercholesteremia    Heart murmur    UTI (urinary tract infection)    SBO (small bowel obstruction)    Gastritis    HLD (hyperlipidemia)    No significant past surgical history      ______________________________________________________________________  MEDS:  MEDICATIONS  (STANDING):  heparin   Injectable 5000 Unit(s) SubCutaneous every 8 hours  hydrochlorothiazide 12.5 milliGRAM(s) Oral daily  lisinopril 20 milliGRAM(s) Oral daily  metoprolol succinate ER 25 milliGRAM(s) Oral daily  sertraline 50 milliGRAM(s) Oral daily    MEDICATIONS  (PRN):  benzocaine 15 mG/menthol 3.6 mG (Sugar-Free) Lozenge 1 Lozenge Oral every 4 hours PRN Sore Throat  ondansetron Injectable 4 milliGRAM(s) IV Push every 6 hours PRN Nausea and/or Vomiting    ______________________________________________________________________  ALL:   Allergies    No Known Allergies    Intolerances      ______________________________________________________________________  SH: Social History:    ______________________________________________________________________  FH:  FAMILY HISTORY:    ______________________________________________________________________  ROS:    CONSTITUTIONAL:  No weight loss, fever, chills, weakness or fatigue.    HEENT:  Eyes:  No visual loss, blurred vision, double vision or yellow sclerae. Ears, Nose, Throat:  No hearing loss, sneezing, congestion, runny nose or sore throat.    SKIN:  No rash or itching.    CARDIOVASCULAR:  No chest pain, chest pressure or chest discomfort. No palpitations or edema.    RESPIRATORY:  No shortness of breath, cough or sputum.    GASTROINTESTINAL:  SEE HPI    GENITOURINARY:  No dysuria, hematuria, urinary frequency    NEUROLOGICAL:  No headache, dizziness, syncope, paralysis, ataxia, numbness or tingling in the extremities. No change in bowel or bladder control.    MUSCULOSKELETAL:  No muscle, back pain, joint pain or stiffness.    HEMATOLOGIC:  No anemia, bleeding or bruising.    LYMPHATICS:  No enlarged nodes. No history of splenectomy.    PSYCHIATRIC:  No history of depression or anxiety.    ENDOCRINOLOGIC:  No reports of sweating, cold or heat intolerance. No polyuria or polydipsia.    ALLERGIES:  No history of asthma, hives, eczema or rhinitis.    GI HPI: Patient seen and examined. Tolerating clear liquid diet. No nausea or vomiting. + BM spiked Temp overnight  ______________________________________________________________________  PHYSICAL EXAM:  T(C): 37.5 (12-16-21 @ 12:51), Max: 38.9 (12-15-21 @ 22:53)  HR: 69 (12-16-21 @ 12:51)  BP: 116/72 (12-16-21 @ 12:51)  RR: 16 (12-16-21 @ 12:51)  SpO2: 98% (12-16-21 @ 12:51)  Wt(kg): --      GEN: NAD   CVS- S1 S2  ABD: soft nontender, non distended, bowel sounds+  LUNGS: clear to auscultation  NEURO: non focal neuro exam;   Extremities: no cyanosis, no calf tenderness, no edema, no clubbing      ______________________________________________________________________  LABS:                        10.5   5.89  )-----------( 155      ( 16 Dec 2021 06:12 )             31.1     12-16    143  |  113<H>  |  9   ----------------------------<  96  3.4<L>   |  26  |  0.86    Ca    8.0<L>      16 Dec 2021 06:12  Phos  2.3     12-15  Mg     2.1     12-15    TPro  6.4  /  Alb  2.8<L>  /  TBili  0.5  /  DBili  x   /  AST  15  /  ALT  18  /  AlkPhos  40  12-15    LIVER FUNCTIONS - ( 15 Dec 2021 23:20 )  Alb: 2.8 g/dL / Pro: 6.4 g/dL / ALK PHOS: 40 U/L / ALT: 18 U/L DA / AST: 15 U/L / GGT: x           ______________________________________________________________________  IMAGING:    ______________________________________________________________________  ASSESSMENT:  66y Female    PLAN:

## 2021-12-21 LAB
CULTURE RESULTS: SIGNIFICANT CHANGE UP
CULTURE RESULTS: SIGNIFICANT CHANGE UP
SPECIMEN SOURCE: SIGNIFICANT CHANGE UP
SPECIMEN SOURCE: SIGNIFICANT CHANGE UP

## 2022-01-16 ENCOUNTER — TRANSCRIPTION ENCOUNTER (OUTPATIENT)
Age: 67
End: 2022-01-16

## 2022-01-26 ENCOUNTER — APPOINTMENT (OUTPATIENT)
Dept: SURGERY | Facility: CLINIC | Age: 67
End: 2022-01-26
Payer: COMMERCIAL

## 2022-01-26 VITALS
BODY MASS INDEX: 32.43 KG/M2 | SYSTOLIC BLOOD PRESSURE: 128 MMHG | WEIGHT: 165.2 LBS | DIASTOLIC BLOOD PRESSURE: 82 MMHG | HEIGHT: 60 IN | HEART RATE: 75 BPM

## 2022-01-26 VITALS — TEMPERATURE: 96.8 F

## 2022-01-26 DIAGNOSIS — Z86.59 PERSONAL HISTORY OF OTHER MENTAL AND BEHAVIORAL DISORDERS: ICD-10-CM

## 2022-01-26 DIAGNOSIS — K56.609 UNSPECIFIED INTESTINAL OBSTRUCTION, UNSPECIFIED AS TO PARTIAL VERSUS COMPLETE OBSTRUCTION: ICD-10-CM

## 2022-01-26 DIAGNOSIS — I10 ESSENTIAL (PRIMARY) HYPERTENSION: ICD-10-CM

## 2022-01-26 DIAGNOSIS — Z83.3 FAMILY HISTORY OF DIABETES MELLITUS: ICD-10-CM

## 2022-01-26 DIAGNOSIS — Z81.8 FAMILY HISTORY OF OTHER MENTAL AND BEHAVIORAL DISORDERS: ICD-10-CM

## 2022-01-26 DIAGNOSIS — Z82.49 FAMILY HISTORY OF ISCHEMIC HEART DISEASE AND OTHER DISEASES OF THE CIRCULATORY SYSTEM: ICD-10-CM

## 2022-01-26 DIAGNOSIS — K86.2 CYST OF PANCREAS: ICD-10-CM

## 2022-01-26 DIAGNOSIS — Z78.9 OTHER SPECIFIED HEALTH STATUS: ICD-10-CM

## 2022-01-26 DIAGNOSIS — Z86.39 PERSONAL HISTORY OF OTHER ENDOCRINE, NUTRITIONAL AND METABOLIC DISEASE: ICD-10-CM

## 2022-01-26 PROCEDURE — 99213 OFFICE O/P EST LOW 20 MIN: CPT

## 2022-01-26 RX ORDER — METOPROLOL TARTRATE 75 MG/1
TABLET, FILM COATED ORAL
Refills: 0 | Status: ACTIVE | COMMUNITY

## 2022-01-26 RX ORDER — ROSUVASTATIN CALCIUM 5 MG/1
TABLET, FILM COATED ORAL
Refills: 0 | Status: ACTIVE | COMMUNITY

## 2022-01-26 RX ORDER — SERTRALINE HYDROCHLORIDE 25 MG/1
TABLET, FILM COATED ORAL
Refills: 0 | Status: ACTIVE | COMMUNITY

## 2022-01-26 RX ORDER — LISINOPRIL 30 MG/1
TABLET ORAL
Refills: 0 | Status: ACTIVE | COMMUNITY

## 2022-01-26 NOTE — PHYSICAL EXAM
[JVD] : no jugular venous distention  [Normal Breath Sounds] : Normal breath sounds [Normal Heart Sounds] : normal heart sounds [No Rash or Lesion] : No rash or lesion [Alert] : alert [Oriented to Person] : oriented to person [Oriented to Place] : oriented to place [Oriented to Time] : oriented to time [Calm] : calm [de-identified] : NAD [de-identified] : NCAT; sclerae anicteric; [de-identified] : soft, NT, ND\par

## 2022-01-26 NOTE — REASON FOR VISIT
[Post Hospitalization] : a post hospitalization visit [FreeTextEntry1] : For Small Bowel Obstruction

## 2022-01-26 NOTE — DATA REVIEWED
[FreeTextEntry1] : PROCEDURE DATE:  12/13/2021  \par \par \par \par INTERPRETATION:  CLINICAL INFORMATION: Nausea and vomiting.\par \par COMPARISON: October 9, 2018.\par \par CONTRAST/COMPLICATIONS:\par IV Contrast: Omnipaque 350.  90 mL administered.   10 mL discarded.\par Oral Contrast: None.\par Complications: None.\par \par PROCEDURE:\par CT of the Abdomen and Pelvis was performed.\par Sagittal and coronal reformats were performed.\par \par FINDINGS:\par LOWER CHEST: Within normal limits.\par \par LIVER: Within normal limits.\par BILE DUCTS: Normal caliber.\par GALLBLADDER: Cholelithiasis.\par SPLEEN: Multiple cysts hypodense lesions throughout the spleen, some of \par which have increased in size, including the largest one which measures \par 1.4 cm (series 2, image 53), previously 1.1 cm.\par PANCREAS: Increased size of cystic lesion in the pancreatic neck (series \par 2, image 35), measuring 3.0 cm, previously 2.0 cm.\par ADRENALS: Unchanged left adrenal parenchymal calcifications. Unchanged \par tiny right adrenal nodule, measuring 0.6 cm.\par KIDNEYS/URETERS: No hydronephrosis. Subcentimeter hypodense left renal \par lesions, too small to characterize.\par \par BLADDER: Within normal limits.\par REPRODUCTIVE ORGANS: Uterine leiomyomas.\par \par BOWEL: Small bowel obstruction with transition point in the distal ileum \par (series 2, image 97). Appendix is normal.\par PERITONEUM: No ascites.\par VESSELS: Atherosclerotic changes.\par RETROPERITONEUM/LYMPH NODES: No lymphadenopathy.\par ABDOMINAL WALL: Small hiatal hernia.\par BONES: Degenerative changes.\par \par IMPRESSION:\par Small bowel obstruction with transition point in the distal ileum.\par \par Increased size of cystic lesion in the pancreatic neck, measuring 3.0 cm, \par previously 2.0 cm. Further evaluation with MRCP with contrast is \par recommended.\par \par Multiple indeterminate splenic lesions, some of which have slightly \par increased in size.\par \par --- End of Report ---\par _________________________________\par ACC: [id number] EXAM:  XR SM INTEST SNGL CON STDY                      \par \par PROCEDURE DATE:  12/14/2021  \par \par INTERPRETATION:  CLINICAL INFORMATION: Small bowel obstruction.\par \par TECHNIQUE:  A single-contrast small bowel follow-through was performed.\par \par FINDINGS:\par \par A  view of the abdomen demonstrates an enteric tube with tip \par overlying the right upper quadrant. Dilated loops of small bowel are \par present in the mid abdomen. Immediate radiograph performed after contrast \par was instilled via existing enteric tube demonstrates contrast within the \par stomach, duodenum, and jejunum. Radiograph performed at 4 hours \par demonstrates contrast throughout the colon and rectum.\par \par IMPRESSION:\par \par Improved small bowel obstruction.\par \par --- End of Report ---\par \par \par HILDA BUSCH MD; Attending Radiologist\par This document has been electronically signed. Dec 15 2021  8:23AM\par \par

## 2022-01-26 NOTE — ASSESSMENT
[FreeTextEntry1] : 66F with resolved SBO and finding of increased size to her already known (but not completely worked up) pancreatic cyst from 2 to now 3 cm. \par \par 10 MRI to evaluate the cyst\par 2) refer to GI for colonoscopy--she is now agreeable to having it done (she was not so in the hospital)

## 2022-03-13 ENCOUNTER — TRANSCRIPTION ENCOUNTER (OUTPATIENT)
Age: 67
End: 2022-03-13

## 2022-03-30 ENCOUNTER — TRANSCRIPTION ENCOUNTER (OUTPATIENT)
Age: 67
End: 2022-03-30

## 2022-03-30 NOTE — ED PROVIDER NOTE - SKIN, MLM
Clinic Care Coordination Contact  [facility]/Voicemail    Clinical Data: Care Coordinator Outreach  Outreach attempted x 2.  Left message on patients son Billy voicemail with call back information and requested return call.  Plan: Care Coordinator will send care coordination introduction letter with care coordinator contact information and explanation of care coordination services via [email or web address]. Care Coordinator will do no further outreaches at this time.    Jonna Lundberg  Community Health Worker  [facility] Care Coordination   Office: [phone]        Skin normal color for race, warm, dry and intact. No evidence of rash.

## 2022-06-08 ENCOUNTER — EMERGENCY (EMERGENCY)
Facility: HOSPITAL | Age: 67
LOS: 1 days | Discharge: HOME | End: 2022-06-08
Attending: EMERGENCY MEDICINE
Payer: MEDICARE

## 2022-06-08 ENCOUNTER — EMERGENCY (EMERGENCY)
Facility: HOSPITAL | Age: 67
LOS: 1 days | Discharge: HOME | End: 2022-06-08
Attending: STUDENT IN AN ORGANIZED HEALTH CARE EDUCATION/TRAINING PROGRAM
Payer: MEDICARE

## 2022-06-08 VITALS
OXYGEN SATURATION: 98 % | HEIGHT: 60 IN | TEMPERATURE: 97 F | HEART RATE: 83 BPM | WEIGHT: 164.91 LBS | SYSTOLIC BLOOD PRESSURE: 116 MMHG | RESPIRATION RATE: 16 BRPM | DIASTOLIC BLOOD PRESSURE: 73 MMHG

## 2022-06-08 VITALS
DIASTOLIC BLOOD PRESSURE: 80 MMHG | WEIGHT: 164.02 LBS | SYSTOLIC BLOOD PRESSURE: 118 MMHG | RESPIRATION RATE: 16 BRPM | HEART RATE: 94 BPM | TEMPERATURE: 100 F | OXYGEN SATURATION: 98 % | HEIGHT: 60 IN

## 2022-06-08 LAB
ALBUMIN SERPL ELPH-MCNC: 3.7 G/DL — SIGNIFICANT CHANGE UP (ref 3.5–5)
ALP SERPL-CCNC: 60 U/L — SIGNIFICANT CHANGE UP (ref 40–120)
ALT FLD-CCNC: 17 U/L DA — SIGNIFICANT CHANGE UP (ref 10–60)
ANION GAP SERPL CALC-SCNC: 9 MMOL/L — SIGNIFICANT CHANGE UP (ref 5–17)
APPEARANCE UR: CLEAR — SIGNIFICANT CHANGE UP
AST SERPL-CCNC: 15 U/L — SIGNIFICANT CHANGE UP (ref 10–40)
BACTERIA # UR AUTO: ABNORMAL /HPF
BASOPHILS # BLD AUTO: 0.05 K/UL — SIGNIFICANT CHANGE UP (ref 0–0.2)
BASOPHILS NFR BLD AUTO: 0.3 % — SIGNIFICANT CHANGE UP (ref 0–2)
BILIRUB SERPL-MCNC: 1 MG/DL — SIGNIFICANT CHANGE UP (ref 0.2–1.2)
BILIRUB UR-MCNC: NEGATIVE — SIGNIFICANT CHANGE UP
BUN SERPL-MCNC: 23 MG/DL — HIGH (ref 7–18)
CALCIUM SERPL-MCNC: 9.9 MG/DL — SIGNIFICANT CHANGE UP (ref 8.4–10.5)
CHLORIDE SERPL-SCNC: 105 MMOL/L — SIGNIFICANT CHANGE UP (ref 96–108)
CO2 SERPL-SCNC: 25 MMOL/L — SIGNIFICANT CHANGE UP (ref 22–31)
COLOR SPEC: YELLOW — SIGNIFICANT CHANGE UP
CREAT SERPL-MCNC: 1.03 MG/DL — SIGNIFICANT CHANGE UP (ref 0.5–1.3)
DIFF PNL FLD: ABNORMAL
EGFR: 60 ML/MIN/1.73M2 — SIGNIFICANT CHANGE UP
EOSINOPHIL # BLD AUTO: 0.11 K/UL — SIGNIFICANT CHANGE UP (ref 0–0.5)
EOSINOPHIL NFR BLD AUTO: 0.7 % — SIGNIFICANT CHANGE UP (ref 0–6)
EPI CELLS # UR: SIGNIFICANT CHANGE UP /HPF
GLUCOSE SERPL-MCNC: 142 MG/DL — HIGH (ref 70–99)
GLUCOSE UR QL: NEGATIVE — SIGNIFICANT CHANGE UP
HCT VFR BLD CALC: 41.9 % — SIGNIFICANT CHANGE UP (ref 34.5–45)
HGB BLD-MCNC: 14.5 G/DL — SIGNIFICANT CHANGE UP (ref 11.5–15.5)
HYALINE CASTS # UR AUTO: ABNORMAL /LPF
IMM GRANULOCYTES NFR BLD AUTO: 0.4 % — SIGNIFICANT CHANGE UP (ref 0–1.5)
KETONES UR-MCNC: ABNORMAL
LEUKOCYTE ESTERASE UR-ACNC: NEGATIVE — SIGNIFICANT CHANGE UP
LIDOCAIN IGE QN: 378 U/L — SIGNIFICANT CHANGE UP (ref 73–393)
LYMPHOCYTES # BLD AUTO: 0.78 K/UL — LOW (ref 1–3.3)
LYMPHOCYTES # BLD AUTO: 4.7 % — LOW (ref 13–44)
MAGNESIUM SERPL-MCNC: 2.1 MG/DL — SIGNIFICANT CHANGE UP (ref 1.6–2.6)
MCHC RBC-ENTMCNC: 28.9 PG — SIGNIFICANT CHANGE UP (ref 27–34)
MCHC RBC-ENTMCNC: 34.6 GM/DL — SIGNIFICANT CHANGE UP (ref 32–36)
MCV RBC AUTO: 83.6 FL — SIGNIFICANT CHANGE UP (ref 80–100)
MONOCYTES # BLD AUTO: 1.12 K/UL — HIGH (ref 0–0.9)
MONOCYTES NFR BLD AUTO: 6.7 % — SIGNIFICANT CHANGE UP (ref 2–14)
NEUTROPHILS # BLD AUTO: 14.59 K/UL — HIGH (ref 1.8–7.4)
NEUTROPHILS NFR BLD AUTO: 87.2 % — HIGH (ref 43–77)
NITRITE UR-MCNC: NEGATIVE — SIGNIFICANT CHANGE UP
NRBC # BLD: 0 /100 WBCS — SIGNIFICANT CHANGE UP (ref 0–0)
PH UR: 5 — SIGNIFICANT CHANGE UP (ref 5–8)
PHOSPHATE SERPL-MCNC: 2.7 MG/DL — SIGNIFICANT CHANGE UP (ref 2.5–4.5)
PLATELET # BLD AUTO: 225 K/UL — SIGNIFICANT CHANGE UP (ref 150–400)
POTASSIUM SERPL-MCNC: 3.5 MMOL/L — SIGNIFICANT CHANGE UP (ref 3.5–5.3)
POTASSIUM SERPL-SCNC: 3.5 MMOL/L — SIGNIFICANT CHANGE UP (ref 3.5–5.3)
PROT SERPL-MCNC: 8 G/DL — SIGNIFICANT CHANGE UP (ref 6–8.3)
PROT UR-MCNC: 15
RAPID RVP RESULT: SIGNIFICANT CHANGE UP
RBC # BLD: 5.01 M/UL — SIGNIFICANT CHANGE UP (ref 3.8–5.2)
RBC # FLD: 12.9 % — SIGNIFICANT CHANGE UP (ref 10.3–14.5)
RBC CASTS # UR COMP ASSIST: ABNORMAL /HPF (ref 0–2)
SARS-COV-2 RNA SPEC QL NAA+PROBE: SIGNIFICANT CHANGE UP
SODIUM SERPL-SCNC: 139 MMOL/L — SIGNIFICANT CHANGE UP (ref 135–145)
SP GR SPEC: 1.02 — SIGNIFICANT CHANGE UP (ref 1.01–1.02)
TROPONIN I, HIGH SENSITIVITY RESULT: 4.6 NG/L — SIGNIFICANT CHANGE UP
UROBILINOGEN FLD QL: NEGATIVE — SIGNIFICANT CHANGE UP
WBC # BLD: 16.71 K/UL — HIGH (ref 3.8–10.5)
WBC # FLD AUTO: 16.71 K/UL — HIGH (ref 3.8–10.5)
WBC UR QL: SIGNIFICANT CHANGE UP /HPF (ref 0–5)

## 2022-06-08 PROCEDURE — 99283 EMERGENCY DEPT VISIT LOW MDM: CPT

## 2022-06-08 PROCEDURE — 99285 EMERGENCY DEPT VISIT HI MDM: CPT

## 2022-06-08 PROCEDURE — 74176 CT ABD & PELVIS W/O CONTRAST: CPT | Mod: 26,MA

## 2022-06-08 NOTE — ED ADULT TRIAGE NOTE - CHIEF COMPLAINT QUOTE
patient states " I have stomach pain all day, crampy like diarrhea "  Was seen in ED this morning for same complaints.  Denies diarrhea

## 2022-06-08 NOTE — ED PROVIDER NOTE - OBJECTIVE STATEMENT
67 year old female with history of hypertension, small bowel obstruction, and anxiety presents to ED complaining of abdominal cramps since last night. She denies any other symptoms, including fever, dysuria, nausea, vomiting, or diarrhea. She reports taking Gas-X last night and having a normal bowel movement today, but she was still having symptoms so she was concerned. Patient states that she took medicine this morning with water with no issue. NKDA. 67 year old female with history of hypertension, small bowel obstruction, and anxiety presents to ED complaining of abdominal cramps since last night. She denies any other symptoms, including vag bleeding, fever, dysuria, nausea, vomiting, or diarrhea. She reports taking Gas-X last night and having a normal bowel movement today, but she was still having symptoms so she was concerned. Patient states that she took medicine this morning with water with no issue. NKDA.

## 2022-06-08 NOTE — ED PROVIDER NOTE - PATIENT PORTAL LINK FT
You can access the FollowMyHealth Patient Portal offered by [facility] by registering at the following website: [email or web address]. By joining [facility]’s FollowMyHealth portal, you will also be able to view your health information using other applications (apps) compatible with our system.

## 2022-06-09 VITALS
SYSTOLIC BLOOD PRESSURE: 121 MMHG | OXYGEN SATURATION: 97 % | RESPIRATION RATE: 16 BRPM | HEART RATE: 90 BPM | DIASTOLIC BLOOD PRESSURE: 82 MMHG

## 2022-06-09 LAB
CULTURE RESULTS: SIGNIFICANT CHANGE UP
SPECIMEN SOURCE: SIGNIFICANT CHANGE UP

## 2022-06-09 NOTE — ED PROVIDER NOTE - PATIENT PORTAL LINK FT
You can access the FollowMyHealth Patient Portal offered by [facility] by registering at the following website: [email or web address]. By joining [facility]’s FollowMyHealth portal, you will also be able to view your health information using other applications (apps) compatible with our system.

## 2022-06-09 NOTE — ED PROVIDER NOTE - NSFOLLOWUPINSTRUCTIONS_ED_ALL_ED_FT
You were seen in the emergency department for abdominal pain.     Please follow-up with your primary care doctor in the next 24-48 hours.,     Please take Ibuprofen and Tylenol as prescribed on the bottles for pain control.     If you have any worsening symptoms, severe headache, chest pain, shortness of breath, please return to the emergency department.

## 2022-06-09 NOTE — ED PROVIDER NOTE - OBJECTIVE STATEMENT
67F, pmh of htn, hld, presenting with two days of abdominal pain. had nausea but no vomiting, diarrhea, chest pain or shortness of breath. was seen in the ED earlier today for the same symptoms and was discharged, but symptoms persisted so returned.

## 2022-06-09 NOTE — ED PROVIDER NOTE - CLINICAL SUMMARY MEDICAL DECISION MAKING FREE TEXT BOX
67F presenting with abdominal pain. non-tender on exam. seen in ED today for similar symptoms but now states pain improved. likely viral illness. will get labs, UA, CT. will reassess.

## 2022-10-28 ENCOUNTER — NON-APPOINTMENT (OUTPATIENT)
Age: 67
End: 2022-10-28

## 2023-05-15 NOTE — DISCHARGE NOTE ADULT - FUNCTIONAL SCREEN CURRENT LEVEL: BATHING, MLM
(0) independent Opzelura Counseling:  I discussed with the patient the risks of Opzelura including but not limited to nasopharngitis, bronchitis, ear infection, eosinophila, hives, diarrhea, folliculitis, tonsillitis, and rhinorrhea.  Taken orally, this medication has been linked to serious infections; higher rate of mortality; malignancy and lymphoproliferative disorders; major adverse cardiovascular events; thrombosis; thrombocytopenia, anemia, and neutropenia; and lipid elevations.

## 2023-06-08 PROCEDURE — 83690 ASSAY OF LIPASE: CPT

## 2023-06-08 PROCEDURE — 84484 ASSAY OF TROPONIN QUANT: CPT

## 2023-06-08 PROCEDURE — 83735 ASSAY OF MAGNESIUM: CPT

## 2023-06-08 PROCEDURE — 93005 ELECTROCARDIOGRAM TRACING: CPT

## 2023-06-08 PROCEDURE — 74176 CT ABD & PELVIS W/O CONTRAST: CPT | Mod: MA

## 2023-06-08 PROCEDURE — 85025 COMPLETE CBC W/AUTO DIFF WBC: CPT

## 2023-06-08 PROCEDURE — 99282 EMERGENCY DEPT VISIT SF MDM: CPT

## 2023-06-08 PROCEDURE — 0225U NFCT DS DNA&RNA 21 SARSCOV2: CPT

## 2023-06-08 PROCEDURE — 80053 COMPREHEN METABOLIC PANEL: CPT

## 2023-06-08 PROCEDURE — 87086 URINE CULTURE/COLONY COUNT: CPT

## 2023-06-08 PROCEDURE — 84100 ASSAY OF PHOSPHORUS: CPT

## 2023-06-08 PROCEDURE — 99285 EMERGENCY DEPT VISIT HI MDM: CPT | Mod: 25

## 2023-06-08 PROCEDURE — 81001 URINALYSIS AUTO W/SCOPE: CPT

## 2023-06-08 PROCEDURE — 36415 COLL VENOUS BLD VENIPUNCTURE: CPT

## 2023-12-11 ENCOUNTER — NON-APPOINTMENT (OUTPATIENT)
Age: 68
End: 2023-12-11